# Patient Record
Sex: FEMALE | Race: WHITE | NOT HISPANIC OR LATINO | Employment: OTHER | URBAN - METROPOLITAN AREA
[De-identification: names, ages, dates, MRNs, and addresses within clinical notes are randomized per-mention and may not be internally consistent; named-entity substitution may affect disease eponyms.]

---

## 2017-06-21 ENCOUNTER — OFFICE VISIT (OUTPATIENT)
Dept: ORTHOPEDICS | Facility: CLINIC | Age: 72
End: 2017-06-21
Payer: MEDICARE

## 2017-06-21 VITALS
HEART RATE: 72 BPM | BODY MASS INDEX: 23.78 KG/M2 | SYSTOLIC BLOOD PRESSURE: 108 MMHG | WEIGHT: 148 LBS | HEIGHT: 66 IN | DIASTOLIC BLOOD PRESSURE: 60 MMHG

## 2017-06-21 DIAGNOSIS — M67.432 GANGLION CYST OF WRIST, LEFT: ICD-10-CM

## 2017-06-21 DIAGNOSIS — M18.12 ARTHRITIS OF CARPOMETACARPAL (CMC) JOINT OF LEFT THUMB: Primary | ICD-10-CM

## 2017-06-21 PROCEDURE — 73130 X-RAY EXAM OF HAND: CPT | Mod: LT,,, | Performed by: ORTHOPAEDIC SURGERY

## 2017-06-21 PROCEDURE — 99204 OFFICE O/P NEW MOD 45 MIN: CPT | Mod: 25,,, | Performed by: ORTHOPAEDIC SURGERY

## 2017-06-21 PROCEDURE — 1159F MED LIST DOCD IN RCRD: CPT | Mod: ,,, | Performed by: ORTHOPAEDIC SURGERY

## 2017-06-21 RX ORDER — VENLAFAXINE HYDROCHLORIDE 75 MG/1
75 CAPSULE, EXTENDED RELEASE ORAL DAILY
COMMUNITY
Start: 2017-03-30 | End: 2018-10-23

## 2017-06-21 RX ORDER — TIOTROPIUM BROMIDE 18 UG/1
CAPSULE ORAL; RESPIRATORY (INHALATION)
COMMUNITY
Start: 2017-03-30 | End: 2019-06-11

## 2017-06-21 RX ORDER — PANTOPRAZOLE SODIUM 40 MG/1
TABLET, DELAYED RELEASE ORAL
COMMUNITY
Start: 2017-01-01 | End: 2017-09-07 | Stop reason: SDUPTHER

## 2017-06-21 RX ORDER — FLUTICASONE PROPIONATE AND SALMETEROL 250; 50 UG/1; UG/1
POWDER RESPIRATORY (INHALATION)
COMMUNITY
Start: 2017-03-30 | End: 2019-06-11

## 2017-06-21 RX ORDER — IBANDRONATE SODIUM 3 MG/3 ML
3 SYRINGE (ML) INTRAVENOUS
COMMUNITY
End: 2017-11-02

## 2017-06-21 RX ORDER — FLUTICASONE PROPIONATE AND SALMETEROL XINAFOATE 115; 21 UG/1; UG/1
2 AEROSOL, METERED RESPIRATORY (INHALATION)
COMMUNITY
End: 2017-11-02

## 2017-06-21 RX ORDER — DILTIAZEM HYDROCHLORIDE 180 MG/1
CAPSULE, EXTENDED RELEASE ORAL
COMMUNITY

## 2017-06-21 RX ORDER — LORATADINE 10 MG/1
10 TABLET ORAL
COMMUNITY

## 2017-06-21 RX ORDER — IBANDRONATE SODIUM 150 MG/1
TABLET, FILM COATED ORAL
COMMUNITY
Start: 2017-03-30 | End: 2017-11-02 | Stop reason: SDUPTHER

## 2017-06-21 RX ORDER — DILTIAZEM HYDROCHLORIDE 180 MG/1
CAPSULE, EXTENDED RELEASE ORAL
COMMUNITY
Start: 2017-06-20 | End: 2017-11-02

## 2017-06-21 NOTE — PROGRESS NOTES
Subjective:       Chief Complaint    Chief Complaint   Patient presents with    Left Hand - Pain     She is here today for a cyst on her left hand.  It is not painful.       DANIEL Alicia is a 71 y.o.  female who presents With pain and the presence of the cyst at the base of her left thumb came up in the past month. Admits to pain when trying to unscrew jar and peeling potatoes that pain is at the base of her thumb CMC joint area. The cyst itself does not cause pain.      Past History  Past Medical History:   Diagnosis Date    Chronic bronchitis     COPD (chronic obstructive pulmonary disease)     Dysthymic disorder     Environmental allergies     GERD (gastroesophageal reflux disease)     Malignant neoplasm of overlapping sites of female breast     Mild intermittent asthma with status asthmaticus     Mixed incontinence     Osteoporosis      Past Surgical History:   Procedure Laterality Date    BLADDER REPAIR      BREAST CYST EXCISION      FOOT SURGERY      bilateral    hernia repairs      HYSTERECTOMY      masectomy      right elbow tennis elbow      TONSILLECTOMY      TUBAL LIGATION       Social History     Social History    Marital status:      Spouse name: N/A    Number of children: N/A    Years of education: N/A     Occupational History    Not on file.     Social History Main Topics    Smoking status: Former Smoker     Types: Cigarettes    Smokeless tobacco: Not on file    Alcohol use No    Drug use: Unknown    Sexual activity: Not on file     Other Topics Concern    Not on file     Social History Narrative    No narrative on file         Medications  Current Outpatient Prescriptions   Medication Sig    DILT- mg CDCR     diltiaZEM (TIAZAC) 180 MG CpSR Take by mouth.    fluticasone-salmeterol (ADVAIR HFA) 115-21 mcg/actuation HFAA Inhale 2 puffs into the lungs.    ibandronate (BONIVA) 150 mg tablet Take by mouth.    loratadine (CLARITIN) 10 mg tablet Take 10  mg by mouth.    pantoprazole (PROTONIX) 40 MG tablet Take by mouth.    tiotropium (SPIRIVA WITH HANDIHALER) 18 mcg inhalation capsule Inhale into the lungs.    venlafaxine (EFFEXOR XR) 75 MG 24 hr capsule Take by mouth.    fluticasone-salmeterol 250-50 mcg/dose (ADVAIR DISKUS) 250-50 mcg/dose diskus inhaler Inhale into the lungs.    ibandronate (BONIVA) 3 mg/3 mL injection Inject 3 mg into the vein.     No current facility-administered medications for this visit.        Allergies  Review of patient's allergies indicates:   Allergen Reactions    Meperidine     Sulfamethoxazole-trimethoprim     Sulfasalazine Hives         Review of Systems     Constitutional: Negative    HENT: Negative  Eyes: Negative  Respiratory: Negative  Cardiovascular: Negative  Musculoskeletal: HPI  Skin: Negative  Neurological: Negative  Hematological: Negative  Endocrine: Negative      Physical Exam    Vitals:    06/21/17 1420   BP: 108/60   Pulse: 72     Physical Examination:Left thumb-2 cm ganglion cyst arising from the triscaphe area. Nontender cyst. There is tenderness and crepitance at the CMC joint. Negative Tinel sign over the carpal canal. Is no atrophy of the thenar eminence.     Skin-no rashes.  General appearance -  well appearing, and in no distress  Mental status - awake  Neck - supple  Chest -  symmetric air entry  Heart - normal rate   Abdomen - soft      Assessment/Plan   Arthritis of carpometacarpal (CMC) joint of left thumb  -     X-Ray Hand 3 view Left  -     X-Ray Wrist Complete Left    Ganglion cyst of wrist, left  -     X-Ray Wrist Complete Left    Other orders  -     Cancel: X-Ray Hand 3 view Right    No evidence of fracture or dislocation on the x-rays. Complete loss of cartilage space at the CMC joint with large osteophyte. Moderate triscaphe arthritis is present    Pain level 3/4 at worst at the base of the thumb. The pain is not bad enough for an injection. Natural history of the ganglion cyst  Discussed.    This note was dictated using voice recognition software and may contain grammatical errors.

## 2017-09-07 RX ORDER — PANTOPRAZOLE SODIUM 40 MG/1
40 TABLET, DELAYED RELEASE ORAL DAILY
Qty: 90 TABLET | Refills: 1 | Status: SHIPPED | OUTPATIENT
Start: 2017-09-07 | End: 2017-11-02 | Stop reason: SDUPTHER

## 2017-09-07 RX ORDER — ALBUTEROL SULFATE 90 UG/1
2 AEROSOL, METERED RESPIRATORY (INHALATION) EVERY 6 HOURS PRN
COMMUNITY

## 2017-11-02 ENCOUNTER — OFFICE VISIT (OUTPATIENT)
Dept: FAMILY MEDICINE | Facility: CLINIC | Age: 72
End: 2017-11-02
Payer: MEDICARE

## 2017-11-02 VITALS
HEART RATE: 69 BPM | RESPIRATION RATE: 14 BRPM | DIASTOLIC BLOOD PRESSURE: 64 MMHG | HEIGHT: 66 IN | BODY MASS INDEX: 23.46 KG/M2 | SYSTOLIC BLOOD PRESSURE: 100 MMHG | WEIGHT: 146 LBS

## 2017-11-02 DIAGNOSIS — J44.89 CHRONIC OBSTRUCTIVE BRONCHITIS: Primary | ICD-10-CM

## 2017-11-02 DIAGNOSIS — E55.9 VITAMIN D DEFICIENCY: ICD-10-CM

## 2017-11-02 DIAGNOSIS — E78.89 LIPIDS ABNORMAL: ICD-10-CM

## 2017-11-02 DIAGNOSIS — K21.00 GASTROESOPHAGEAL REFLUX DISEASE WITH ESOPHAGITIS: ICD-10-CM

## 2017-11-02 DIAGNOSIS — M81.8 OTHER OSTEOPOROSIS WITHOUT CURRENT PATHOLOGICAL FRACTURE: ICD-10-CM

## 2017-11-02 DIAGNOSIS — Z11.59 NEED FOR HEPATITIS C SCREENING TEST: ICD-10-CM

## 2017-11-02 DIAGNOSIS — F34.1 DYSTHYMIA: ICD-10-CM

## 2017-11-02 PROBLEM — N39.46 MIXED STRESS AND URGE URINARY INCONTINENCE: Status: ACTIVE | Noted: 2017-11-02

## 2017-11-02 PROBLEM — J45.20 MILD INTERMITTENT ASTHMA: Status: ACTIVE | Noted: 2017-11-02

## 2017-11-02 PROBLEM — R73.01 IMPAIRED FASTING GLUCOSE: Status: ACTIVE | Noted: 2017-11-02

## 2017-11-02 PROBLEM — Z78.9 NON-SMOKER: Status: ACTIVE | Noted: 2017-11-02

## 2017-11-02 PROCEDURE — 99214 OFFICE O/P EST MOD 30 MIN: CPT | Mod: ,,, | Performed by: INTERNAL MEDICINE

## 2017-11-02 RX ORDER — PANTOPRAZOLE SODIUM 40 MG/1
40 TABLET, DELAYED RELEASE ORAL DAILY
Qty: 90 TABLET | Refills: 3 | Status: SHIPPED | OUTPATIENT
Start: 2017-11-02 | End: 2018-04-23 | Stop reason: SDUPTHER

## 2017-11-02 RX ORDER — IBUPROFEN 800 MG/1
800 TABLET ORAL EVERY 6 HOURS PRN
COMMUNITY

## 2017-11-02 RX ORDER — IBANDRONATE SODIUM 150 MG/1
150 TABLET, FILM COATED ORAL
Qty: 3 TABLET | Refills: 3 | Status: SHIPPED | OUTPATIENT
Start: 2017-11-02 | End: 2018-02-19 | Stop reason: SDUPTHER

## 2017-11-02 NOTE — PROGRESS NOTES
Subjective:       Patient ID: Yee Alicia is a 72 y.o. female.    Chief Complaint: COPD; Osteoporosis; Gastroesophageal Reflux; and Depression    Ms. Riddle is a 72-year-old  female who comes for follow-up. She has underlying COPD, osteoporosis, gastroesophageal reflux.    She uses an oxygen tank and uses inhalers. In the office she appears to be comfortable at this point. She has no recent fractures or back pain. Reflux seems to be stable as long as she takes pantoprazole.    She has underlying family and social stressors. Her  has a diagnosis of cancer and he seems to be declining both socially and physically.    Patient's exercise tolerance is also somewhat limited. She is due for her screenings for hepatitis C, lipid panel and she has never had a vitamin D level given her osteoporosis.    She had seen a cardiologist Dr. Alonso in past for palpitations and was prescribed Cardizem. Details of that cardiac workup needs to be looked into again. She did have a bone density checked 3 years ago which needs to be reviewed.      COPD   This is a chronic problem. The current episode started more than 1 year ago. The problem has been waxing and waning. Associated symptoms include fatigue. Pertinent negatives include no abdominal pain, arthralgias, chest pain, chills, congestion, coughing, fever, headaches, joint swelling, rash or sore throat. The treatment provided moderate relief.   Gastroesophageal Reflux   She reports no abdominal pain, no chest pain, no coughing, no globus sensation, no sore throat or no tooth decay. The symptoms are aggravated by certain foods and medications. Associated symptoms include fatigue. She has tried a PPI for the symptoms. The treatment provided moderate relief.       Past Medical History:   Diagnosis Date    Chronic bronchitis     COPD (chronic obstructive pulmonary disease)     Dysthymic disorder     Environmental allergies     GERD (gastroesophageal reflux disease)      Malignant neoplasm of overlapping sites of female breast     Mild intermittent asthma with status asthmaticus     Mixed incontinence     Osteoporosis      Social History     Social History    Marital status:      Spouse name: N/A    Number of children: N/A    Years of education: N/A     Occupational History    Not on file.     Social History Main Topics    Smoking status: Former Smoker     Types: Cigarettes    Smokeless tobacco: Never Used    Alcohol use No    Drug use: No    Sexual activity: Yes     Partners: Male     Other Topics Concern    Not on file     Social History Narrative    No narrative on file     Past Surgical History:   Procedure Laterality Date    BLADDER REPAIR      BREAST CYST EXCISION      FOOT SURGERY      bilateral    hernia repairs      HYSTERECTOMY      masectomy      right elbow tennis elbow      TONSILLECTOMY      TUBAL LIGATION       Family History   Problem Relation Age of Onset    Diabetes Mother     Hypertension Father        Review of Systems   Constitutional: Positive for fatigue. Negative for activity change, chills, fever and unexpected weight change.   HENT: Negative for congestion, postnasal drip, sinus pressure and sore throat.    Eyes: Negative for pain, discharge and visual disturbance.   Respiratory: Positive for shortness of breath. Negative for cough and chest tightness.         COPD   Cardiovascular: Negative for chest pain, palpitations and leg swelling.   Gastrointestinal: Negative for abdominal distention, abdominal pain, anal bleeding, constipation and diarrhea.        GERD   Genitourinary: Negative for difficulty urinating, dysuria, flank pain and frequency.   Musculoskeletal: Negative for arthralgias and joint swelling.   Skin: Negative for color change, pallor and rash.   Allergic/Immunologic: Negative for environmental allergies, food allergies and immunocompromised state.   Neurological: Negative for dizziness, seizures,  "light-headedness and headaches.   Hematological: Negative for adenopathy. Does not bruise/bleed easily.   Psychiatric/Behavioral: Negative for agitation, confusion and dysphoric mood. The patient is not nervous/anxious.        Objective:       Vitals:    11/02/17 1046   BP: 100/64   Pulse: 69   Resp: 14   Weight: 66.2 kg (146 lb)   Height: 5' 6" (1.676 m)   PF: 370 L/min     Physical Exam   Constitutional: She is oriented to person, place, and time. She appears well-developed and well-nourished. She is cooperative. No distress.   HENT:   Head: Normocephalic and atraumatic.   Eyes: Conjunctivae, EOM and lids are normal. Pupils are equal, round, and reactive to light. Lids are everted and swept, no foreign bodies found. Right pupil is round and reactive. Left pupil is round and reactive.   Neck: Trachea normal and normal range of motion. Neck supple.   Cardiovascular: Normal rate, regular rhythm, S1 normal, S2 normal, normal heart sounds and intact distal pulses.    Pulmonary/Chest: She has decreased breath sounds in the right middle field, the right lower field, the left middle field and the left lower field.   Abdominal: Soft. Bowel sounds are normal. There is no rigidity and no guarding.   Musculoskeletal: Normal range of motion.   Lymphadenopathy:     She has no cervical adenopathy.   Neurological: She is alert and oriented to person, place, and time.   Skin: Skin is warm and dry. Capillary refill takes less than 2 seconds.   Psychiatric: She has a normal mood and affect. Her behavior is normal. Judgment and thought content normal. Her affect is not labile and not inappropriate.   Though not overtly depressed, patient appears to be anhedonic.   Nursing note and vitals reviewed.      Assessment:       1. Chronic obstructive bronchitis    2. Gastroesophageal reflux disease with esophagitis    3. Dysthymia    4. Other osteoporosis without current pathological fracture    5. Need for hepatitis C screening test    6. " Lipids abnormal    7. Vitamin D deficiency         Plan:           Chronic obstructive bronchitis    Gastroesophageal reflux disease with esophagitis  -     pantoprazole (PROTONIX) 40 MG tablet; Take 1 tablet (40 mg total) by mouth once daily.  Dispense: 90 tablet; Refill: 3    Dysthymia    Other osteoporosis without current pathological fracture  -     ibandronate (BONIVA) 150 mg tablet; Take 1 tablet (150 mg total) by mouth every 30 days.  Dispense: 3 tablet; Refill: 3    Need for hepatitis C screening test  -     Hepatitis C antibody; Future; Expected date: 11/02/2017    Lipids abnormal  -     Lipid panel; Future; Expected date: 11/02/2017    Vitamin D deficiency  -     Vitamin D; Future; Expected date: 11/02/2017    Patient COPD seems to be stable. She is following with Dr. Gage for the same. She uses Spiriva inhaler, Advair inhaler and as needed albuterol.    Her reflux seems to be stable and she takes pantoprazole for the same.    Family and social stressors seem to be predominating at this point.    She is already updated on influenza vaccination.    She is being referred on Boniva and pantoprazole.    As per age appropriate status I would screen her for hepatitis C, lipid panel and vitamin D. There is increased incidence of vitamin D deficiency in patients who have osteoporosis and COPD.

## 2017-11-02 NOTE — PATIENT INSTRUCTIONS
Medicines for Acid Reflux  Your healthcare provider has told you that you have acid reflux. This condition causes stomach acid to wash up into your throat. For most people, acid reflux is troubling but not dangerous. But left untreated, acid reflux sometimes damages the esophagus. Medicines can help control acid reflux and limit your risk of future problems.  Medicines for acid reflux  Your healthcare provider may prescribe medicine to help treat your acid reflux. Medicine will be based on your symptoms and any test results. Your provider will explain how to take your medicine. You will also be told about possible side effects.  Reducing stomach acid  Your provider may suggest antacids that you can buy over the counter. Antacids can give fast relief. Or you may be told to take a type of medicine called H2 blockers. These are available over the counter and by prescription (for higher doses).  Blocking stomach acid  In more severe cases, your healthcare provider may suggest stronger medicines such as proton pump inhibitors (PPIs). These keep the stomach from making acid. They are often prescribed for long-term use.  Other medicines  In some cases medicines to reduce or block stomach acid may not work. Then you may be switched to another type of medicine that helps your stomach empty better.     Date Last Reviewed: 10/1/2016  © 5037-0795 [employer]. [address] [place] [address], [place] [address]. All rights reserved. This information is not intended as a substitute for professional medical care. Always follow your healthcare professional's instructions.

## 2017-11-06 ENCOUNTER — TELEPHONE (OUTPATIENT)
Dept: FAMILY MEDICINE | Facility: CLINIC | Age: 72
End: 2017-11-06

## 2017-11-06 LAB — VITAMIN D, 1,25 (OH)2: 61.1 NG/ML (ref 30–100)

## 2017-11-08 ENCOUNTER — TELEPHONE (OUTPATIENT)
Dept: FAMILY MEDICINE | Facility: CLINIC | Age: 72
End: 2017-11-08

## 2017-11-08 LAB — HCV AB SERPL QL IA: 0.1 S/CO RATIO (ref 0–0.9)

## 2017-11-08 NOTE — TELEPHONE ENCOUNTER
----- Message from Efren Modi MD sent at 11/8/2017  3:43 PM CST -----  Hepatitis C screening is negative

## 2018-02-19 DIAGNOSIS — M81.8 OTHER OSTEOPOROSIS WITHOUT CURRENT PATHOLOGICAL FRACTURE: ICD-10-CM

## 2018-02-19 RX ORDER — IBANDRONATE SODIUM 150 MG/1
150 TABLET, FILM COATED ORAL
Qty: 3 TABLET | Refills: 3 | Status: SHIPPED | OUTPATIENT
Start: 2018-02-19 | End: 2018-10-23 | Stop reason: SDUPTHER

## 2018-04-23 ENCOUNTER — OFFICE VISIT (OUTPATIENT)
Dept: FAMILY MEDICINE | Facility: CLINIC | Age: 73
End: 2018-04-23
Payer: MEDICARE

## 2018-04-23 VITALS
DIASTOLIC BLOOD PRESSURE: 65 MMHG | SYSTOLIC BLOOD PRESSURE: 97 MMHG | HEIGHT: 66 IN | BODY MASS INDEX: 24.06 KG/M2 | RESPIRATION RATE: 16 BRPM | OXYGEN SATURATION: 89 % | WEIGHT: 149.69 LBS | HEART RATE: 71 BPM

## 2018-04-23 DIAGNOSIS — J44.89 CHRONIC OBSTRUCTIVE BRONCHITIS: ICD-10-CM

## 2018-04-23 DIAGNOSIS — Z78.0 ASYMPTOMATIC MENOPAUSE: ICD-10-CM

## 2018-04-23 DIAGNOSIS — F34.1 DYSTHYMIA: ICD-10-CM

## 2018-04-23 DIAGNOSIS — K21.00 GASTROESOPHAGEAL REFLUX DISEASE WITH ESOPHAGITIS: Primary | ICD-10-CM

## 2018-04-23 PROCEDURE — 99213 OFFICE O/P EST LOW 20 MIN: CPT | Mod: ,,, | Performed by: INTERNAL MEDICINE

## 2018-04-23 RX ORDER — PANTOPRAZOLE SODIUM 40 MG/1
40 TABLET, DELAYED RELEASE ORAL DAILY
Qty: 90 TABLET | Refills: 3 | Status: SHIPPED | OUTPATIENT
Start: 2018-04-23 | End: 2018-11-02 | Stop reason: SDUPTHER

## 2018-04-23 NOTE — PROGRESS NOTES
Subjective:       Patient ID: Yee Alicia is a 72 y.o. female.    Chief Complaint: Gastroesophageal Reflux and COPD    Ms. Alicia is a 72-year-old  female who comes for follow-up. She has underlying reflux which is stable with pantoprazole. She knows what type of diet is going to aggravate her reflux and especially it is any gravy with tomato.    She also has COPD and follows Dr. Gage for the same. She uses a combination of 81 inhaler and also as needed rescue beer 2 agonists. She also uses Advair inhaler. Does far she is stable on this.    She had the last bone density done approximately a year or 2 back. I have records indicating it was done in 2014. She'll verify this. She is taking Boniva and calcium supplements without any problems.    She has mild depression and anxiety for which she is taking venlafaxine with success. Her  was diagnosed with cancer and patient had some stress  because of those issues. Patient's stress also including her mother and son with aging and medical issues which continue to keep her in a stage of borderline stress and sanity.    I've advised the patient to consider cutting down the pantoprazole to alternate days and tried her naturally heal her stomach. Certainly on the days that she has a pizza with tomato gravy-that they would be a pantoprazole day. On the other hand, on a day when she is likely to eat a bland diet-probably she can skip pantoprazole. Long-term side effects of pantoprazole include kidney problems, vitamin B-12 deficiency and probably some memory problems. These have been antidotal reports and have have never been proven but I would like to get that chance of protection to the patient.      Gastroesophageal Reflux   She complains of heartburn. She reports no abdominal pain, no chest pain, no coughing, no hoarse voice or no sore throat. The current episode started more than 1 year ago. Associated symptoms include fatigue. She has tried a PPI for  the symptoms. The treatment provided moderate relief.   COPD   This is a chronic problem. Associated symptoms include fatigue. Pertinent negatives include no abdominal pain, arthralgias, chest pain, chills, congestion, coughing, fever, headaches, joint swelling, rash or sore throat.       Past Medical History:   Diagnosis Date    Allergy     Mepridine, Bactrim, BEE sting    Chronic bronchitis     COPD (chronic obstructive pulmonary disease)     Dysthymic disorder     Environmental allergies     GERD (gastroesophageal reflux disease)     Malignant neoplasm of overlapping sites of female breast     Mild intermittent asthma with status asthmaticus     Mixed incontinence     Osteoporosis      Social History     Social History    Marital status:      Spouse name: N/A    Number of children: N/A    Years of education: N/A     Occupational History    Not on file.     Social History Main Topics    Smoking status: Former Smoker     Types: Cigarettes    Smokeless tobacco: Never Used    Alcohol use No    Drug use: No    Sexual activity: Yes     Partners: Male     Other Topics Concern    Not on file     Social History Narrative    No narrative on file     Past Surgical History:   Procedure Laterality Date    BLADDER REPAIR      BREAST CYST EXCISION      FOOT SURGERY      bilateral    hernia repairs      HYSTERECTOMY      masectomy      right elbow tennis elbow      TONSILLECTOMY      TUBAL LIGATION       Family History   Problem Relation Age of Onset    Diabetes Mother     Hypertension Father        Review of Systems   Constitutional: Positive for fatigue. Negative for activity change, chills, fever and unexpected weight change.   HENT: Negative for congestion, hoarse voice, postnasal drip, sinus pressure and sore throat.    Eyes: Negative for pain, discharge and visual disturbance.   Respiratory: Positive for shortness of breath. Negative for cough and chest tightness.         COPD  "  Cardiovascular: Negative for chest pain, palpitations and leg swelling.   Gastrointestinal: Positive for heartburn. Negative for abdominal distention, abdominal pain and anal bleeding.        GERD   Genitourinary: Negative for difficulty urinating, dysuria, flank pain and frequency.   Musculoskeletal: Negative for arthralgias and joint swelling.   Skin: Negative for color change, pallor and rash.   Allergic/Immunologic: Negative for environmental allergies, food allergies and immunocompromised state.   Neurological: Negative for dizziness, seizures, light-headedness and headaches.   Hematological: Negative for adenopathy. Does not bruise/bleed easily.   Psychiatric/Behavioral: Negative for agitation, confusion and dysphoric mood. The patient is not nervous/anxious.         Mild depression       Objective:       Vitals:    04/23/18 1435   BP: 97/65   Pulse: 71   Resp: 16   SpO2: (!) 89%   Weight: 67.9 kg (149 lb 11.2 oz)   Height: 5' 6" (1.676 m)     Physical Exam   Constitutional: She appears well-developed and well-nourished. She is cooperative. No distress.   HENT:   Head: Normocephalic and atraumatic.   Eyes: Conjunctivae, EOM and lids are normal. Pupils are equal, round, and reactive to light. Lids are everted and swept, no foreign bodies found. Right pupil is round and reactive. Left pupil is round and reactive.   Neck: Trachea normal and normal range of motion. Neck supple.   Cardiovascular: Normal rate, regular rhythm, S1 normal, S2 normal and normal heart sounds.    Pulmonary/Chest: She has decreased breath sounds in the right middle field, the right lower field, the left middle field and the left lower field.   Abdominal: Soft. Bowel sounds are normal. There is no rigidity and no guarding.   Musculoskeletal: Normal range of motion.   Lymphadenopathy:     She has no cervical adenopathy.   Neurological: She is alert.   Skin: Skin is warm and dry.   Psychiatric: Her affect is not labile and not " inappropriate. She is not actively hallucinating. Cognition and memory are not impaired. She does not express impulsivity.   Though not overtly depressed, patient appears to be anhedonic.   Nursing note and vitals reviewed.      Assessment:       1. Gastroesophageal reflux disease with esophagitis    2. Dysthymia    3. Asymptomatic menopause    4. Chronic obstructive bronchitis         Plan:           Gastroesophageal reflux disease with esophagitis  -     pantoprazole (PROTONIX) 40 MG tablet; Take 1 tablet (40 mg total) by mouth once daily.  Dispense: 90 tablet; Refill: 3    Dysthymia    Asymptomatic menopause  -     DXA Bone Density Spine And Hip    Chronic obstructive bronchitis    As discussed in history and physical patient will try to cut down on pantoprazole and see how she does. She is doing okay on that medication . venlafaxine given the fact that she has persistent stressors. (92-year-old mother, son and  who has a cancer diagnosis.)  A shunt doesn't fall me that on less stressful phase she takes half of venlafaxine. On her usual stressful day she takes a full pill of venlafaxine.       New prescription for pantoprazole has been sent    I'll give with a new order for bone density if it is allowed.    She continues on Boniva with calcium and vitamin D3 supplementation. I will also recommended some strength exercises. All things being equal, then I'll see her back in 6 months time. Patient's oxygen saturation is 89-90% on room air. She does use oxygen at home. She does get a little short winded on walking. She definitely uses oxygen at nighttime. Reflux/depression    Follow-up in 6 months or earlier as needed.

## 2018-04-23 NOTE — PATIENT INSTRUCTIONS

## 2018-05-22 ENCOUNTER — TELEPHONE (OUTPATIENT)
Dept: FAMILY MEDICINE | Facility: CLINIC | Age: 73
End: 2018-05-22

## 2018-10-23 ENCOUNTER — OFFICE VISIT (OUTPATIENT)
Dept: FAMILY MEDICINE | Facility: CLINIC | Age: 73
End: 2018-10-23
Payer: MEDICARE

## 2018-10-23 VITALS
BODY MASS INDEX: 22.98 KG/M2 | HEIGHT: 66 IN | SYSTOLIC BLOOD PRESSURE: 98 MMHG | OXYGEN SATURATION: 96 % | DIASTOLIC BLOOD PRESSURE: 68 MMHG | RESPIRATION RATE: 16 BRPM | WEIGHT: 143 LBS | HEART RATE: 66 BPM

## 2018-10-23 DIAGNOSIS — J44.89 CHRONIC OBSTRUCTIVE BRONCHITIS: ICD-10-CM

## 2018-10-23 DIAGNOSIS — Z01.818 PREOP EXAMINATION: ICD-10-CM

## 2018-10-23 DIAGNOSIS — M81.8 OTHER OSTEOPOROSIS WITHOUT CURRENT PATHOLOGICAL FRACTURE: ICD-10-CM

## 2018-10-23 DIAGNOSIS — F34.1 DYSTHYMIA: Primary | ICD-10-CM

## 2018-10-23 PROBLEM — I10 ESSENTIAL HYPERTENSION: Status: ACTIVE | Noted: 2018-10-23

## 2018-10-23 PROCEDURE — 99214 OFFICE O/P EST MOD 30 MIN: CPT | Mod: ,,, | Performed by: INTERNAL MEDICINE

## 2018-10-23 RX ORDER — VENLAFAXINE 75 MG/1
75 TABLET ORAL DAILY
Qty: 90 TABLET | Refills: 3 | Status: SHIPPED | OUTPATIENT
Start: 2018-10-23 | End: 2018-11-02 | Stop reason: SDUPTHER

## 2018-10-23 RX ORDER — IBANDRONATE SODIUM 150 MG/1
150 TABLET, FILM COATED ORAL
Qty: 3 TABLET | Refills: 3 | Status: SHIPPED | OUTPATIENT
Start: 2018-10-23 | End: 2019-02-25 | Stop reason: SDUPTHER

## 2018-10-23 RX ORDER — VENLAFAXINE 75 MG/1
75 TABLET ORAL DAILY
COMMUNITY
End: 2018-10-23 | Stop reason: SDUPTHER

## 2018-10-23 NOTE — PATIENT INSTRUCTIONS
Diagnosing COPD  Your healthcare provider will use your past health history, a physical exam, and certain tests to diagnose COPD.  Health history  Your healthcare provider learns about your health history by asking questions. Topics include:  · History of present illness. Tell your healthcare provider about your symptoms. Also tell him or her how long you have had them.  · Past medical and surgical history. Share other health problems and surgery you have had.  · Family history. Report serious health problems in close family members. This is especially true of any lung problems.  · Social and environmental history. The most important factor in COPD is whether you smoke or have smoked in the past. You should also tell your provider if you have been around secondhand smoke, harmful chemicals, or air pollution.  · Functional assessment. Report whether breathing gets in the way of your daily activities.  Physical exam    Your provider will examine you. He or she will check your heart and lungs with a stethoscope. The focus will be on your airways, including your nose and throat.   Diagnostic tests  · Pulmonary function tests measure the flow of air into and out of your lungs. They also check how much air your lungs can hold. The most common pulmonary function test is spirometry. This measures how fast and how much air you can blow out (exhale). This test is important to help diagnose COPD.  · Pulse oximetry shows how much oxygen is in your blood (oxygen saturation). This may be done at rest. It may also be done during and after exercise.  · Arterial blood gas tests measure levels of oxygen and carbon dioxide in your blood.  · Chest X-rays show the size and shape of your lungs. They can also show certain problems in the lungs.  · CT scans show detailed images of the lungs.  Date Last Reviewed: 10/1/2016  © 7400-4110 [employer]. [address] [place] [address], [place] [address]. All rights reserved. This  information is not intended as a substitute for professional medical care. Always follow your healthcare professional's instructions.

## 2018-10-23 NOTE — PROGRESS NOTES
Subjective:       Patient ID: Yee Alicia is a 73 y.o. female.    Chief Complaint: Pre-op Exam (cateract surg )    Ms Alicia is here for getting medical clearance for proposed cataract surgery to be done under monitored anesthesia care.    She has underlying COPD and is on chronic oxygen dependency. She uses Advair and Spiriva inhaler.    She uses usually oxygen and she has to exert or walk longer distances.    Recent hospitalization or exacerbation of COPD. She is currently not on steroids.    She does take diltiazem for history of possibly atrial arrhythmias.  Sheet of his osteoporosis for which she takes Boniva and supplements.        Hypertension   This is a chronic problem. The current episode started more than 1 year ago. Associated symptoms include shortness of breath. Pertinent negatives include no chest pain, headaches or palpitations. Risk factors for coronary artery disease include sedentary lifestyle. Past treatments include calcium channel blockers. There is no history of coarctation of the aorta, pheochromocytoma or renovascular disease.       Past Medical History:   Diagnosis Date    Allergy     Mepridine, Bactrim, BEE sting    Chronic bronchitis     COPD (chronic obstructive pulmonary disease)     Dysthymic disorder     Environmental allergies     GERD (gastroesophageal reflux disease)     Malignant neoplasm of overlapping sites of female breast     Mild intermittent asthma with status asthmaticus     Mixed incontinence     Osteoporosis      Social History     Socioeconomic History    Marital status:      Spouse name: Not on file    Number of children: Not on file    Years of education: Not on file    Highest education level: Not on file   Social Needs    Financial resource strain: Not on file    Food insecurity - worry: Not on file    Food insecurity - inability: Not on file    Transportation needs - medical: Not on file    Transportation needs - non-medical: Not on file    Occupational History    Not on file   Tobacco Use    Smoking status: Former Smoker     Types: Cigarettes    Smokeless tobacco: Never Used   Substance and Sexual Activity    Alcohol use: No    Drug use: No    Sexual activity: Yes     Partners: Male   Other Topics Concern    Not on file   Social History Narrative    Not on file     Past Surgical History:   Procedure Laterality Date    BLADDER REPAIR      BREAST CYST EXCISION      FOOT SURGERY      bilateral    hernia repairs      HYSTERECTOMY      masectomy      right elbow tennis elbow      TONSILLECTOMY      TUBAL LIGATION       Family History   Problem Relation Age of Onset    Diabetes Mother     Hypertension Father        Review of Systems   Constitutional: Positive for fatigue. Negative for activity change, chills, fever and unexpected weight change.   HENT: Negative for congestion, postnasal drip, sinus pressure and sore throat.    Eyes: Positive for visual disturbance (cataracts). Negative for pain and discharge.   Respiratory: Positive for shortness of breath. Negative for cough and chest tightness.         COPD   Cardiovascular: Negative for chest pain, palpitations and leg swelling.   Gastrointestinal: Negative for abdominal distention, abdominal pain and anal bleeding.        GERD- stable   Endocrine: Negative for polydipsia and polyphagia.   Genitourinary: Negative for difficulty urinating, dysuria, flank pain and frequency.   Musculoskeletal: Negative for arthralgias and joint swelling.        Underlying osteopenia   Skin: Negative for color change, pallor and rash.   Allergic/Immunologic: Negative for environmental allergies, food allergies and immunocompromised state.   Neurological: Negative for dizziness, seizures, light-headedness and headaches.   Hematological: Negative for adenopathy. Does not bruise/bleed easily.   Psychiatric/Behavioral: Negative for agitation, confusion and dysphoric mood. The patient is not nervous/anxious.  "        Mild depression         Objective:      Blood pressure 98/68, pulse 66, resp. rate 16, height 5' 6" (1.676 m), weight 64.9 kg (143 lb), SpO2 96 %, peak flow 400 L/min. Body mass index is 23.08 kg/m².  Physical Exam   Constitutional: She appears well-developed and well-nourished. She is cooperative. No distress.   HENT:   Head: Normocephalic and atraumatic.   Eyes: Conjunctivae, EOM and lids are normal. Lids are everted and swept, no foreign bodies found. Right pupil is round and reactive. Left pupil is round and reactive.   Neck: Trachea normal and normal range of motion. Neck supple.   Cardiovascular: Normal rate, regular rhythm, S1 normal, S2 normal and normal heart sounds.   Pulmonary/Chest: No respiratory distress. She has no wheezes. She has no rales.   Abdominal: Soft. Bowel sounds are normal. There is no rigidity and no guarding.   Lymphadenopathy:     She has no cervical adenopathy.   Neurological: She is alert.   Skin: Skin is warm and dry.   Psychiatric: Her affect is not labile and not inappropriate. She is not actively hallucinating. Cognition and memory are not impaired. She does not express impulsivity.   Though not overtly depressed, patient appears to be anhedonic. Issues with her  and likely the location to be with her parents.   Nursing note and vitals reviewed.        Assessment:       1. Dysthymia    2. Preop examination    3. Other osteoporosis without current pathological fracture    4. Chronic obstructive bronchitis           No visits with results within 3 Month(s) from this visit.   Latest known visit with results is:   Office Visit on 11/02/2017   Component Date Value Ref Range Status    Hepatitis C Ab 11/06/2017 0.1  0.0 - 0.9 s/co ratio Final    Vitamin D, 1,25 (OH)2 11/06/2017 61.10  30.00 - 100.00 ng/mL Final         Plan:           Dysthymia  -     venlafaxine (EFFEXOR) 75 MG tablet; Take 1 tablet (75 mg total) by mouth once daily.  Dispense: 90 tablet; Refill: " 3    Preop examination    Other osteoporosis without current pathological fracture  -     ibandronate (BONIVA) 150 mg tablet; Take 1 tablet (150 mg total) by mouth every 30 days.  Dispense: 3 tablet; Refill: 3    Chronic obstructive bronchitis      Patient is medically cleared for proposed cataract surgery. She has home oxygen and she should continue with oxygen while she is going to surgery. She should use her inhaler on the morning of surgery including Advair and Spiriva.    She is taking the diltiazem for history of palpitations and arrhythmias especially which got worse after taking chocolates.    She will be refilled meds for osteoporosis    Fup 4 months    Current Outpatient Medications:     albuterol (PROAIR HFA) 90 mcg/actuation inhaler, Inhale 2 puffs into the lungs every 6 (six) hours as needed for Wheezing. Rescue, Disp: , Rfl:     diltiaZEM (TIAZAC) 180 MG CpSR, Take by mouth., Disp: , Rfl:     fluticasone-salmeterol 250-50 mcg/dose (ADVAIR DISKUS) 250-50 mcg/dose diskus inhaler, Inhale into the lungs., Disp: , Rfl:     ibandronate (BONIVA) 150 mg tablet, Take 1 tablet (150 mg total) by mouth every 30 days., Disp: 3 tablet, Rfl: 3    ibuprofen (ADVIL,MOTRIN) 800 MG tablet, Take 800 mg by mouth every 6 (six) hours as needed for Pain., Disp: , Rfl:     loratadine (CLARITIN) 10 mg tablet, Take 10 mg by mouth., Disp: , Rfl:     pantoprazole (PROTONIX) 40 MG tablet, Take 1 tablet (40 mg total) by mouth once daily., Disp: 90 tablet, Rfl: 3    tiotropium (SPIRIVA WITH HANDIHALER) 18 mcg inhalation capsule, Inhale into the lungs., Disp: , Rfl:     venlafaxine (EFFEXOR) 75 MG tablet, Take 1 tablet (75 mg total) by mouth once daily., Disp: 90 tablet, Rfl: 3

## 2018-11-02 DIAGNOSIS — F34.1 DYSTHYMIA: ICD-10-CM

## 2018-11-02 DIAGNOSIS — K21.00 GASTROESOPHAGEAL REFLUX DISEASE WITH ESOPHAGITIS: ICD-10-CM

## 2018-11-02 RX ORDER — PANTOPRAZOLE SODIUM 40 MG/1
40 TABLET, DELAYED RELEASE ORAL DAILY
Qty: 90 TABLET | Refills: 3 | Status: SHIPPED | OUTPATIENT
Start: 2018-11-02

## 2018-11-02 RX ORDER — VENLAFAXINE 75 MG/1
75 TABLET ORAL DAILY
Qty: 90 TABLET | Refills: 3 | Status: SHIPPED | OUTPATIENT
Start: 2018-11-02 | End: 2019-10-24 | Stop reason: SDUPTHER

## 2018-12-07 RX ORDER — LEVOFLOXACIN 750 MG/1
30 TABLET ORAL
COMMUNITY
End: 2019-02-25

## 2018-12-07 RX ORDER — HYDROCODONE BITARTRATE AND ACETAMINOPHEN 5; 325 MG/1; MG/1
10 TABLET ORAL
COMMUNITY
End: 2019-02-25

## 2018-12-07 RX ORDER — ANASTROZOLE 1 MG/1
30 TABLET ORAL
COMMUNITY

## 2018-12-07 RX ORDER — METRONIDAZOLE 500 MG/1
30 TABLET ORAL
COMMUNITY
End: 2019-02-25

## 2018-12-10 ENCOUNTER — OFFICE VISIT (OUTPATIENT)
Dept: PULMONOLOGY | Facility: CLINIC | Age: 73
End: 2018-12-10
Payer: MEDICARE

## 2018-12-10 VITALS
SYSTOLIC BLOOD PRESSURE: 102 MMHG | WEIGHT: 142 LBS | HEART RATE: 87 BPM | OXYGEN SATURATION: 93 % | BODY MASS INDEX: 22.82 KG/M2 | HEIGHT: 66 IN | DIASTOLIC BLOOD PRESSURE: 80 MMHG

## 2018-12-10 DIAGNOSIS — J44.9 CHRONIC OBSTRUCTIVE PULMONARY DISEASE, UNSPECIFIED COPD TYPE: ICD-10-CM

## 2018-12-10 DIAGNOSIS — G47.34 NOCTURNAL HYPOXEMIA: ICD-10-CM

## 2018-12-10 PROCEDURE — 99214 OFFICE O/P EST MOD 30 MIN: CPT | Mod: ,,, | Performed by: INTERNAL MEDICINE

## 2018-12-10 NOTE — PATIENT INSTRUCTIONS
Chronic Lung Disease: Preventing Lung Infections  Chronic lung diseases include chronic obstructive pulmonary disease (COPD), which includes chronic bronchitis and emphysema. Other chronic lung diseases include pulmonary fibrosis, sarcoidosis, and other conditions. When you have chronic lung diseases, it's very important to protect yourself from respiratory infections, like colds, the flu, and lung infections. Infections may cause your lung condition to worsen. Although you can't completely avoid them, there are things you can do to lessen the chance of infections.    Take precautions  Taking the following precautions can help you avoid illness:  · Remember to keep your hands away from your nose and mouth. Germs on your hands get into your respiratory system this way.  · Wash your hands often. When you wash them:  ¨ Use soap and warm water.  ¨ Rub your hands together well for at least 20 seconds.  ¨ Make sure to rinse them well.  ¨ Dry your hands on clean towels or air-dry them.  · Use hand  containing alcohol, if you are unable to wash your hands. Use the  after touching doorknobs, handles, and supermarket carts, for example, since lots of people touch them. Then wash your hands as soon as you can.  · To help prevent the flu, get a flu vaccination every year. This may be given at your healthcare provider's office, a drugstore, or pharmacy, or at work. Get your flu shot as soon as the vaccines are available in your area. This is usually around September each year.  · To help prevent pneumococcal pneumonia, get pneumonia vaccinations. Talk with your healthcare provider about which pneumococcal vaccinations you need.  · Try to stay away from people with respiratory infections, such as colds or the flu. Stay away from crowded places, like shopping centers or movie theatres during cold and flu season.  · If you smoke, think about quitting. In addition to causing or worsening many lung conditions, the  lung damage from smoking increases your risk of infections. Stay away from others who smoke, too. This is also harmful and increases your chance of infections.  Date Last Reviewed: 4/14/2016  © 6625-6959 [employer]. [address] [place] [address], [place] [address]. All rights reserved. This information is not intended as a substitute for professional medical care. Always follow your healthcare professional's instructions.       call and let us know what is cheaper Trelegy or Advair and Spiriva  Keep sleeping on your oxygen

## 2018-12-10 NOTE — PROGRESS NOTES
SUBJECTIVE:    Patient ID: Yee Alicia is a 73 y.o. female.    Chief Complaint: Follow-up ( 6 mo check )    HPI   Patient here today feeling well.  She is either using Advair and spiriva, or Trelegy depending on cost.  She is wearing her oxygen with sleep and as needed throughout the day.  She is going out of town to take care of her parents that are having surgery.    Past Medical History:   Diagnosis Date    Allergy     Mepridine, Bactrim, BEE sting    Breast cancer     Cervical spine fracture     Chronic bronchitis     COPD (chronic obstructive pulmonary disease)     Dysthymic disorder     Environmental allergies     GERD (gastroesophageal reflux disease)     Lumbar vertebral fracture     Lung disease     copd     Malignant neoplasm of overlapping sites of female breast     Mixed incontinence     Osteoporosis      Past Surgical History:   Procedure Laterality Date    BLADDER REPAIR      BREAST CYST EXCISION      FOOT SURGERY      bilateral    hernia repairs      HYSTERECTOMY      masectomy      right elbow tennis elbow      TONSILLECTOMY      TUBAL LIGATION       Family History   Problem Relation Age of Onset    Diabetes Mother     Hypertension Father         Social History:   Marital Status:   Occupation: homemaker   Alcohol History:  reports that she does not drink alcohol.  Tobacco History:  reports that she quit smoking about 22 years ago. Her smoking use included cigarettes. She has a 10.00 pack-year smoking history. she has never used smokeless tobacco.  Drug History:  reports that she does not use drugs.    Review of patient's allergies indicates:   Allergen Reactions    Bee sting [allergen ext-venom-honey bee]     Meperidine     Sulfamethoxazole-trimethoprim     Sulfasalazine Hives       Current Outpatient Medications   Medication Sig Dispense Refill    albuterol (PROAIR HFA) 90 mcg/actuation inhaler Inhale 2 puffs into the lungs every 6 (six) hours as needed for  "Wheezing. Rescue      anastrozole (ARIMIDEX) 1 mg Tab Take 30 tablets by mouth.      diltiaZEM (TIAZAC) 180 MG CpSR Take by mouth.      fluticasone-salmeterol 250-50 mcg/dose (ADVAIR DISKUS) 250-50 mcg/dose diskus inhaler Inhale into the lungs.      ibandronate (BONIVA) 150 mg tablet Take 1 tablet (150 mg total) by mouth every 30 days. 3 tablet 3    ibuprofen (ADVIL,MOTRIN) 800 MG tablet Take 800 mg by mouth every 6 (six) hours as needed for Pain.      loratadine (CLARITIN) 10 mg tablet Take 10 mg by mouth.      pantoprazole (PROTONIX) 40 MG tablet Take 1 tablet (40 mg total) by mouth once daily. 90 tablet 3    tiotropium (SPIRIVA WITH HANDIHALER) 18 mcg inhalation capsule Inhale into the lungs.      venlafaxine (EFFEXOR) 75 MG tablet Take 1 tablet (75 mg total) by mouth once daily. 90 tablet 3    fluticasone-umeclidin-vilanter (TRELEGY ELLIPTA) 100-62.5-25 mcg DsDv Inhale 1 puff into the lungs once daily. 1 each 6    HYDROcodone-acetaminophen (NORCO) 5-325 mg per tablet Take 10 tablets by mouth.      levoFLOXacin (LEVAQUIN) 750 MG tablet Take 30 tablets by mouth.      metroNIDAZOLE (FLAGYL) 500 MG tablet Take 30 tablets by mouth.       No current facility-administered medications for this visit.          Last PFT: 12/13/2017  Last Chest xray :03/21/2016    Review of Systems  General: Feeling Well.  Eyes: Vision is good.  ENT:  No sinusitis or pharyngitis.   Heart:: No chest pain or palpitations.  Lungs: breathing is stable   GI: No Nausea, vomiting, constipation, diarrhea, or reflux.  : No dysuria, hesitancy, or nocturia.  Musculoskeletal: back and neck pAin   Skin: No lesions or rashes.  Neuro: No headaches or neuropathy.  Lymph: No edema or adenopathy.  Psych: No anxiety or depression.  Endo: No weight change.    OBJECTIVE:      /80 (BP Location: Right arm, Patient Position: Sitting)   Pulse 87   Ht 5' 6" (1.676 m)   Wt 64.4 kg (142 lb)   SpO2 (!) 93%   BMI 22.92 kg/m²     Physical " "Exam  GENERAL: Older patient in no distress.  HEENT: Pupils equal and reactive. Extraocular movements intact. Nose intact.      Pharynx moist.  NECK: Supple.   HEART: Regular rate and rhythm. No murmur or gallop auscultated.  LUNGS: Clear to auscultation and percussion. Lung excursion symmetrical. No change in fremitus. No adventitial noises.  ABDOMEN: Bowel sounds present. Non-tender, no masses palpated.  EXTREMITIES: Normal muscle tone and joint movement, no cyanosis or clubbing.   LYMPHATICS: No adenopathy palpated, no edema.  SKIN: Dry, intact, no lesions.   NEURO: Cranial nerves II-XII intact. Motor strength 5/5 bilaterally, upper and lower extremities.  PSYCH: Appropriate affect.    Amisha Herring NP served in the capacity as a "scribe" for this patient encounter.  A "face to face" encounter occurred with Dr. Gage on this date  The treatment plan and medical decision making is outlined below:         Assessment:       1. Chronic obstructive pulmonary disease, unspecified COPD type    2. Nocturnal hypoxemia          Plan:       Chronic obstructive pulmonary disease, unspecified COPD type    Nocturnal hypoxemia    Other orders  -     fluticasone-umeclidin-vilanter (TRELEGY ELLIPTA) 100-62.5-25 mcg DsDv; Inhale 1 puff into the lungs once daily.  Dispense: 1 each; Refill: 6      Call and let us know what is cheaper Trelegy or Advair and Spiriva  Keep sleeping on your oxygen  Had flu shot    Follow-up in about 6 months (around 6/10/2019).        "

## 2019-02-12 ENCOUNTER — OFFICE VISIT (OUTPATIENT)
Dept: PODIATRY | Facility: CLINIC | Age: 74
End: 2019-02-12
Payer: MEDICARE

## 2019-02-12 VITALS
WEIGHT: 142 LBS | DIASTOLIC BLOOD PRESSURE: 89 MMHG | HEART RATE: 89 BPM | BODY MASS INDEX: 22.82 KG/M2 | SYSTOLIC BLOOD PRESSURE: 128 MMHG | TEMPERATURE: 99 F | HEIGHT: 66 IN

## 2019-02-12 DIAGNOSIS — M19.079 INFLAMMATION OF FOOT JOINT: Primary | ICD-10-CM

## 2019-02-12 PROCEDURE — 99214 PR OFFICE/OUTPT VISIT, EST, LEVL IV, 30-39 MIN: ICD-10-PCS | Mod: ,,, | Performed by: PODIATRIST

## 2019-02-12 PROCEDURE — 99214 OFFICE O/P EST MOD 30 MIN: CPT | Mod: ,,, | Performed by: PODIATRIST

## 2019-02-12 NOTE — PROGRESS NOTES
[address] [facility] [address]  [place] [address]  Phone: [phone]   [phone]    Patient's PCP:Efren Modi MD  Referring Provider: No ref. provider found    Subjective:      Chief Complaint:: Foot Pain (Would like new Orthotics)    DANIEL Alicia is a 73 y.o. female who presents with a complaint of left foot pain under 2nd toe,would like new orthotics,hers are worn out. Patients rates pain 8/10 on pain scale.      Vitals:    02/12/19 1014   BP: 128/89   Pulse: 89   Temp: 98.6 °F (37 °C)     Shoe Size: 7    Past Surgical History:   Procedure Laterality Date    BLADDER REPAIR      BREAST CYST EXCISION      FOOT SURGERY      bilateral    hernia repairs      HYSTERECTOMY      masectomy      right elbow tennis elbow      TONSILLECTOMY      TUBAL LIGATION       Past Medical History:   Diagnosis Date    Allergy     Mepridine, Bactrim, BEE sting    Breast cancer     Cervical spine fracture     Chronic bronchitis     COPD (chronic obstructive pulmonary disease)     Dysthymic disorder     Environmental allergies     GERD (gastroesophageal reflux disease)     Lumbar vertebral fracture     Lung disease     copd     Malignant neoplasm of overlapping sites of female breast     Mixed incontinence     Osteoporosis      Family History   Problem Relation Age of Onset    Diabetes Mother     Hypertension Father         Social History:   Marital Status:   Alcohol History:  reports that she does not drink alcohol.  Tobacco History:  reports that she quit smoking about 23 years ago. Her smoking use included cigarettes. She has a 10.00 pack-year smoking history. she has never used smokeless tobacco.  Drug History:  reports that she does not use drugs.    Review of patient's allergies indicates:   Allergen Reactions    Bee sting [allergen ext-venom-honey bee]     Meperidine     Sulfamethoxazole-trimethoprim     Sulfasalazine Hives       Current Outpatient Medications   Medication Sig  Dispense Refill    albuterol (PROAIR HFA) 90 mcg/actuation inhaler Inhale 2 puffs into the lungs every 6 (six) hours as needed for Wheezing. Rescue      anastrozole (ARIMIDEX) 1 mg Tab Take 30 tablets by mouth.      diltiaZEM (TIAZAC) 180 MG CpSR Take by mouth.      ibandronate (BONIVA) 150 mg tablet Take 1 tablet (150 mg total) by mouth every 30 days. 3 tablet 3    ibuprofen (ADVIL,MOTRIN) 800 MG tablet Take 800 mg by mouth every 6 (six) hours as needed for Pain.      loratadine (CLARITIN) 10 mg tablet Take 10 mg by mouth.      pantoprazole (PROTONIX) 40 MG tablet Take 1 tablet (40 mg total) by mouth once daily. 90 tablet 3    venlafaxine (EFFEXOR) 75 MG tablet Take 1 tablet (75 mg total) by mouth once daily. 90 tablet 3    fluticasone-salmeterol 250-50 mcg/dose (ADVAIR DISKUS) 250-50 mcg/dose diskus inhaler Inhale into the lungs.      fluticasone-umeclidin-vilanter (TRELEGY ELLIPTA) 100-62.5-25 mcg DsDv Inhale 1 puff into the lungs once daily. 1 each 6    HYDROcodone-acetaminophen (NORCO) 5-325 mg per tablet Take 10 tablets by mouth.      levoFLOXacin (LEVAQUIN) 750 MG tablet Take 30 tablets by mouth.      metroNIDAZOLE (FLAGYL) 500 MG tablet Take 30 tablets by mouth.      tiotropium (SPIRIVA WITH HANDIHALER) 18 mcg inhalation capsule Inhale into the lungs.       No current facility-administered medications for this visit.        Review of Systems   Constitutional: Negative for chills, fatigue, fever and unexpected weight change.   HENT: Negative for hearing loss and trouble swallowing.    Eyes: Negative for photophobia and visual disturbance.   Respiratory: Negative for cough, shortness of breath and wheezing.    Cardiovascular: Negative for chest pain, palpitations and leg swelling.   Gastrointestinal: Negative for abdominal pain and nausea.   Genitourinary: Negative for dysuria and frequency.   Musculoskeletal: Positive for arthralgias, back pain and neck pain. Negative for joint swelling.   Skin:  Negative for rash.   Neurological: Negative for tremors, seizures, weakness, numbness and headaches.   Hematological: Bruises/bleeds easily.         Objective:        Physical Exam:   Foot Exam    Left Foot/Ankle      Inspection and Palpation  Tenderness: lesser metatarsophalangeal joints (Pain plantar second MPJ left foot)  Swelling: none   Arch: pes cavus  Hammertoes: absent  Claw toes: absent  Hallux valgus: no  Hallux limitus: no  Skin Exam: skin intact;     Neurovascular  Dorsalis pedis: 2+  Posterior tibial: 2+  Saphenous nerve sensation: normal  Tibial nerve sensation: normal  Superficial peroneal nerve sensation: normal  Deep peroneal nerve sensation: normal  Sural nerve sensation: normal    Comments  Name plantar second MPJ left foot there is no ulcerations or signs of infection. Prominent second metatarsal head plantar surface of the foot.    Physical Exam   Cardiovascular:   Pulses:       Dorsalis pedis pulses are 2+ on the left side.        Posterior tibial pulses are 2+ on the left side.       Imaging:            Assessment:       1. Inflammation of foot joint      Plan:   Inflammation of foot joint    Will place accommodative felt pad on old custom orthotics. Was sent patient out for new custom orthotics to offload pressure areas. This will also help with her plantar fasciitis.  Because of pain radiating proximal along the extensor tendons of the left leg we will place the patient in a Cam Walker boot cast that she will use for approximately 4 weeks or until pain resolves. If pain does not resolve in 4 weeks she'll return to see me.  Follow-up if symptoms worsen or fail to improve.    Procedures - None    Counseling:     I provided patient education verbally regarding:   Patient diagnosis, treatment options, as well as alternatives, risks, and benefits.     This note was created using Dragon voice recognition software that occasionally misinterpreted phrases or words.

## 2019-02-25 ENCOUNTER — OFFICE VISIT (OUTPATIENT)
Dept: FAMILY MEDICINE | Facility: CLINIC | Age: 74
End: 2019-02-25
Payer: MEDICARE

## 2019-02-25 VITALS
HEART RATE: 63 BPM | HEIGHT: 66 IN | BODY MASS INDEX: 22.66 KG/M2 | RESPIRATION RATE: 16 BRPM | SYSTOLIC BLOOD PRESSURE: 120 MMHG | WEIGHT: 141 LBS | DIASTOLIC BLOOD PRESSURE: 66 MMHG | OXYGEN SATURATION: 97 %

## 2019-02-25 DIAGNOSIS — M81.8 OTHER OSTEOPOROSIS WITHOUT CURRENT PATHOLOGICAL FRACTURE: ICD-10-CM

## 2019-02-25 DIAGNOSIS — F34.1 DYSTHYMIA: ICD-10-CM

## 2019-02-25 DIAGNOSIS — E78.2 MIXED HYPERLIPIDEMIA: ICD-10-CM

## 2019-02-25 DIAGNOSIS — K21.00 GASTROESOPHAGEAL REFLUX DISEASE WITH ESOPHAGITIS: ICD-10-CM

## 2019-02-25 DIAGNOSIS — J44.89 CHRONIC OBSTRUCTIVE BRONCHITIS: Primary | ICD-10-CM

## 2019-02-25 LAB
CHOLEST/HDLC SERPL: 4.3 {RATIO}
CHOLESTEROL, TOTAL: 245
HIGH DENSITY CHOLESTEROL: 57 MG/DL
LDLC SERPL CALC-MCNC: 159 MG/DL
NONHDLC SERPL-MCNC: 188 MG/DL
TRIGL SERPL-MCNC: 145 MG/DL (ref 40–160)

## 2019-02-25 PROCEDURE — 80061 LIPID PANEL: CPT | Mod: QW,,, | Performed by: INTERNAL MEDICINE

## 2019-02-25 PROCEDURE — 80061 POCT LIPID PANEL: ICD-10-PCS | Mod: QW,,, | Performed by: INTERNAL MEDICINE

## 2019-02-25 PROCEDURE — 99214 OFFICE O/P EST MOD 30 MIN: CPT | Mod: ,,, | Performed by: INTERNAL MEDICINE

## 2019-02-25 PROCEDURE — 99214 PR OFFICE/OUTPT VISIT, EST, LEVL IV, 30-39 MIN: ICD-10-PCS | Mod: ,,, | Performed by: INTERNAL MEDICINE

## 2019-02-25 RX ORDER — DILTIAZEM HYDROCHLORIDE 180 MG/1
1 CAPSULE, COATED, EXTENDED RELEASE ORAL DAILY
COMMUNITY
Start: 2019-02-24 | End: 2019-02-25

## 2019-02-25 RX ORDER — IBANDRONATE SODIUM 150 MG/1
150 TABLET, FILM COATED ORAL
Qty: 3 TABLET | Refills: 3 | Status: SHIPPED | OUTPATIENT
Start: 2019-02-25

## 2019-02-25 NOTE — PROGRESS NOTES
Subjective:       Patient ID: Yee Alicia is a 73 y.o. female.    Chief Complaint: Asthma; COPD; Gastroesophageal Reflux; Anxiety; and Hyperlipidemia    Ms. Alicia is a 73-year-old  female who comes for follow-up. She has underlying reflux which is stable with pantoprazole.  She also has COPD and follows Dr. Gage for the same. She uses a combination of Advair inhaler and also as needed rescue B2 agonists. She also uses Advair/ Spiriva inhaler.     She had the last bone density done approximately a year or 2 back.     She has mild depression and anxiety for which she is taking venlafaxine with success. Her  was diagnosed with cancer and patient had some stress  because of those issues.         Gastroesophageal Reflux   She complains of heartburn. She reports no abdominal pain, no chest pain, no coughing, no hoarse voice or no sore throat. The current episode started more than 1 year ago. Associated symptoms include fatigue. She has tried a PPI for the symptoms. The treatment provided moderate relief.   COPD   This is a chronic problem. Associated symptoms include fatigue. Pertinent negatives include no abdominal pain, arthralgias, chest pain, chills, congestion, coughing, fever, headaches, joint swelling, rash or sore throat.   Hyperlipidemia   This is a chronic problem. The current episode started more than 1 year ago. The problem is uncontrolled. Recent lipid tests were reviewed and are variable. She has no history of obesity. Associated symptoms include shortness of breath. Pertinent negatives include no chest pain. She is currently on no antihyperlipidemic treatment. Compliance problems include psychosocial issues.        Past Medical History:   Diagnosis Date    Allergy     Mepridine, Bactrim, BEE sting    Breast cancer     Cervical spine fracture     Chronic bronchitis     COPD (chronic obstructive pulmonary disease)     Dysthymic disorder     Environmental allergies     GERD  (gastroesophageal reflux disease)     Lumbar vertebral fracture     Lung disease     copd     Malignant neoplasm of overlapping sites of female breast     Mixed incontinence     Osteoporosis      Social History     Socioeconomic History    Marital status:      Spouse name: Not on file    Number of children: Not on file    Years of education: Not on file    Highest education level: Not on file   Social Needs    Financial resource strain: Not on file    Food insecurity - worry: Not on file    Food insecurity - inability: Not on file    Transportation needs - medical: Not on file    Transportation needs - non-medical: Not on file   Occupational History    Occupation: homemaker    Tobacco Use    Smoking status: Former Smoker     Packs/day: 1.00     Years: 10.00     Pack years: 10.00     Types: Cigarettes     Last attempt to quit:      Years since quittin.1    Smokeless tobacco: Never Used   Substance and Sexual Activity    Alcohol use: No    Drug use: No    Sexual activity: Yes     Partners: Male   Other Topics Concern    Not on file   Social History Narrative    Not on file     Past Surgical History:   Procedure Laterality Date    BLADDER REPAIR      BREAST CYST EXCISION      FOOT SURGERY      bilateral    hernia repairs      HYSTERECTOMY      masectomy      right elbow tennis elbow      TONSILLECTOMY      TUBAL LIGATION       Family History   Problem Relation Age of Onset    Diabetes Mother     Hypertension Father        Review of Systems   Constitutional: Positive for fatigue. Negative for activity change, chills, fever and unexpected weight change.   HENT: Negative for congestion, hoarse voice, postnasal drip, sinus pressure and sore throat.    Eyes: Negative for pain, discharge and visual disturbance.   Respiratory: Positive for shortness of breath. Negative for cough and chest tightness.         COPD   Cardiovascular: Negative for chest pain, palpitations and leg  "swelling.   Gastrointestinal: Positive for heartburn. Negative for abdominal distention, abdominal pain and anal bleeding.        GERD   Genitourinary: Negative for difficulty urinating, dysuria, flank pain and frequency.   Musculoskeletal: Negative for arthralgias and joint swelling.   Skin: Negative for color change, pallor and rash.   Allergic/Immunologic: Negative for environmental allergies, food allergies and immunocompromised state.   Neurological: Negative for dizziness, seizures, light-headedness and headaches.   Hematological: Negative for adenopathy. Does not bruise/bleed easily.   Psychiatric/Behavioral: Negative for agitation, confusion and dysphoric mood. The patient is not nervous/anxious.         Mild depression       Objective:       Vitals:    02/25/19 0952   BP: 120/66   Pulse: 63   Resp: 16   SpO2: 97%   Weight: 64 kg (141 lb)   Height: 5' 6" (1.676 m)   PF: 360 L/min     Physical Exam   Constitutional: She appears well-developed and well-nourished. She is cooperative. No distress.   HENT:   Head: Normocephalic and atraumatic.   Eyes: Conjunctivae, EOM and lids are normal. Pupils are equal, round, and reactive to light. Lids are everted and swept, no foreign bodies found. Right pupil is round and reactive. Left pupil is round and reactive.   Neck: Trachea normal and normal range of motion. Neck supple.   Cardiovascular: Normal rate, regular rhythm, S1 normal and S2 normal. Exam reveals distant heart sounds.   Pulmonary/Chest: Effort normal. No respiratory distress. She has rales in the right lower field and the left lower field.   Crackles at the base of lung bilaterally.   Abdominal: Soft. Bowel sounds are normal. There is no rigidity and no guarding.   Musculoskeletal: Normal range of motion.   Lymphadenopathy:     She has no cervical adenopathy.   Neurological: She is alert.   Skin: Skin is warm and dry.   Psychiatric: Her affect is not labile and not inappropriate. She is not actively " hallucinating. Cognition and memory are not impaired. She does not express impulsivity.   Though not overtly depressed, patient appears to be anhedonic.   Nursing note and vitals reviewed.      Assessment:       1. Chronic obstructive bronchitis    2. Gastroesophageal reflux disease with esophagitis    3. Dysthymia    4. Other osteoporosis without current pathological fracture    5. Mixed hyperlipidemia         Plan:           Chronic obstructive bronchitis    Gastroesophageal reflux disease with esophagitis    Dysthymia    Other osteoporosis without current pathological fracture  -     ibandronate (BONIVA) 150 mg tablet; Take 1 tablet (150 mg total) by mouth every 30 days.  Dispense: 3 tablet; Refill: 3    Mixed hyperlipidemia  -     POCT Lipid Panel    Advised Ms. Alicia for Anti reflux measures like small feequent meals, avoid spicy and greasy food. Head end up at night.    Lipid panel is elevated with LDL cholesterol greater than 150. I decided to doing good. She has a family history of hyperlipidemia but nobody is on any medications for cholesterol.    She does not smoke cigarettes at this point. Her blood pressure is okay. She is not diabetic. Her 10 year risk would be probably on the low side for significant coronary artery disease. At this point I will advise her to consider fish oral capsules. Omega-3 fatty acid      She continues on Boniva with calcium and vitamin D3 supplementation. I will also recommended some strength exercises. All things being equal, then I'll see her back in 6 months time. Patient's oxygen saturations are fairly good at this point.    Follow-up in 6 months or earlier as needed.    Please note that patient has difficulty getting blood from her veins. Fingerstick was performed in the office for lipid panel.

## 2019-02-25 NOTE — PATIENT INSTRUCTIONS
Tips to Control Acid Reflux    To control acid reflux, youll need to make some basic diet and lifestyle changes. The simple steps outlined below may be all youll need to ease discomfort.  Watch what you eat  · Avoid fatty foods and spicy foods.  · Eat fewer acidic foods, such as citrus and tomato-based foods. These can increase symptoms.  · Limit drinking alcohol, caffeine, and fizzy beverages. All increase acid reflux.  · Try limiting chocolate, peppermint, and spearmint. These can worsen acid reflux in some people.  Watch when you eat  · Avoid lying down for 3 hours after eating.  · Do not snack before going to bed.  Raise your head  Raising your head and upper body by 4 to 6 inches helps limit reflux when youre lying down. Put blocks under the head of your bed frame to raise it.  Other changes  · Lose weight, if you need to  · Dont exercise near bedtime  · Avoid tight-fitting clothes  · Limit aspirin and ibuprofen  · Stop smoking   Date Last Reviewed: 7/1/2016  © 9525-9962 [employer]. [address] [place] [address]. All rights reserved. This information is not intended as a substitute for professional medical care. Always follow your healthcare professional's instructions.        Cholesterol Quiz  How much do you know about cholesterol? Gerardo each of the following statements True or False.     True False    ? ? 1. Nothing I do can lower my cholesterol.   ? ? 2. All cholesterol in my blood is bad.   ? ? 3. Exercise cant help me control my cholesterol.   ? ? 4. I dont have to worry if my cholesterol is just a little high.   ? ? 5. To lower my cholesterol, I just need to stop eating eggs.            Answers  1. FALSE. Your eating and exercise habits play a big role in controlling cholesterol. If you smoke, quitting can also help you get cholesterol under control. And your doctor can prescribe medicine if you need extra help.  2. FALSE. Some cholesterol is needed for your body to  work. And some types of cholesterol are better for your body than others.  3. FALSE. Exercise increases the amount of HDL (good) cholesterol in your bloodstream. This is good for your body and your health.  4. FALSE. Even if your cholesterol is just a little high, you are at increased risk for a heart attack or stroke.  5. FALSE. Egg yolks are high in cholesterol. But eating foods that are high in saturated fats and trans fats is more likely to raise your cholesterol levels.  Date Last Reviewed: 6/8/2015 © 2000-2017 [employer]. [address] [place] [address]. All rights reserved. This information is not intended as a substitute for professional medical care. Always follow your healthcare professional's instructions.

## 2019-04-04 ENCOUNTER — OFFICE VISIT (OUTPATIENT)
Dept: FAMILY MEDICINE | Facility: CLINIC | Age: 74
End: 2019-04-04
Payer: MEDICARE

## 2019-04-04 VITALS
WEIGHT: 141 LBS | HEIGHT: 66 IN | HEART RATE: 70 BPM | SYSTOLIC BLOOD PRESSURE: 122 MMHG | DIASTOLIC BLOOD PRESSURE: 67 MMHG | BODY MASS INDEX: 22.66 KG/M2

## 2019-04-04 DIAGNOSIS — Z71.84 ENCOUNTER FOR COUNSELING FOR TRAVEL: Primary | ICD-10-CM

## 2019-04-04 PROCEDURE — 99213 PR OFFICE/OUTPT VISIT, EST, LEVL III, 20-29 MIN: ICD-10-PCS | Mod: ,,, | Performed by: INTERNAL MEDICINE

## 2019-04-04 PROCEDURE — 99213 OFFICE O/P EST LOW 20 MIN: CPT | Mod: ,,, | Performed by: INTERNAL MEDICINE

## 2019-04-04 NOTE — PROGRESS NOTES
Subjective:       Patient ID: Yee Alicia is a 73 y.o. female.    Chief Complaint: Immunizations (trip to [facility] )    Ms. Fraire is a 73-year-old  female who plans to travel to [facility] to visit the [facility].    She is interested in getting appropriate immunizations.    Underlying medical issues include asthma for several years currently stable on albuterol and Advair.    She also has osteoporosis for which she takes Boniva and calcium with vitamin D3 supplements. She also is a breast cancer survivor and she is taking Arimidex.    Her childhood immunizations including MMR have been completed. Prevnar vaccine in June 2018 and annual influenza vaccine has been has been taken. She also had a shingles vaccine in past. She did get it tetanus vaccination in 2015.    She has never had any hepatitis vaccinations. He has been screened for hepatitis C which was negative in 2017.    She will be mostly going to the [facility]. She will be in a group tour.      Past Medical History:   Diagnosis Date    Allergy     Mepridine, Bactrim, BEE sting    Breast cancer     Cervical spine fracture     Chronic bronchitis     COPD (chronic obstructive pulmonary disease)     Dysthymic disorder     Environmental allergies     GERD (gastroesophageal reflux disease)     Lumbar vertebral fracture     Lung disease     copd     Malignant neoplasm of overlapping sites of female breast     Mixed incontinence     Osteoporosis      Social History     Socioeconomic History    Marital status:      Spouse name: Not on file    Number of children: Not on file    Years of education: Not on file    Highest education level: Not on file   Occupational History    Occupation: homemaker    Social Needs    Financial resource strain: Not on file    Food insecurity:     Worry: Not on file     Inability: Not on file    Transportation needs:     Medical: Not on file     Non-medical: Not on file   Tobacco  Use    Smoking status: Former Smoker     Packs/day: 1.00     Years: 10.00     Pack years: 10.00     Types: Cigarettes     Last attempt to quit:      Years since quittin.2    Smokeless tobacco: Never Used   Substance and Sexual Activity    Alcohol use: No    Drug use: No    Sexual activity: Yes     Partners: Male   Lifestyle    Physical activity:     Days per week: Not on file     Minutes per session: Not on file    Stress: Not on file   Relationships    Social connections:     Talks on phone: Not on file     Gets together: Not on file     Attends Anab[employer]ist service: Not on file     Active member of club or organization: Not on file     Attends meetings of clubs or organizations: Not on file     Relationship status: Not on file   Other Topics Concern    Not on file   Social History Narrative    Not on file     Past Surgical History:   Procedure Laterality Date    BLADDER REPAIR      BREAST CYST EXCISION      FOOT SURGERY      bilateral    hernia repairs      HYSTERECTOMY      masectomy      right elbow tennis elbow      TONSILLECTOMY      TUBAL LIGATION       Family History   Problem Relation Age of Onset    Diabetes Mother     Hypertension Father        Review of Systems   Constitutional: Positive for fatigue. Negative for activity change, chills, fever and unexpected weight change.   HENT: Negative for congestion, postnasal drip, sinus pressure and sore throat.    Eyes: Negative for pain, discharge and visual disturbance.   Respiratory: Positive for shortness of breath. Negative for cough and chest tightness.         COPD   Cardiovascular: Negative for chest pain, palpitations and leg swelling.   Gastrointestinal: Negative for abdominal distention, abdominal pain and anal bleeding.        GERD   Genitourinary: Negative for difficulty urinating, dysuria, flank pain and frequency.   Musculoskeletal: Negative for arthralgias and joint swelling.   Skin: Negative for color change, pallor and  rash.   Allergic/Immunologic: Negative for environmental allergies, food allergies and immunocompromised state.   Neurological: Negative for dizziness, seizures, light-headedness and headaches.   Hematological: Negative for adenopathy. Does not bruise/bleed easily.   Psychiatric/Behavioral: Negative for agitation, confusion and dysphoric mood. The patient is not nervous/anxious.         Mild depression         Objective:      Blood pressure 122/67, pulse 70, weight 64 kg (141 lb). Body mass index is 22.76 kg/m².  Physical Exam   Constitutional: She appears well-developed and well-nourished. She is cooperative. No distress.   HENT:   Head: Normocephalic.   Eyes: Conjunctivae, EOM and lids are normal. Lids are everted and swept, no foreign bodies found. Right pupil is round and reactive. Left pupil is round and reactive.   Neck: Trachea normal and normal range of motion. Neck supple.   Cardiovascular: Normal rate, regular rhythm, S1 normal and S2 normal. Exam reveals distant heart sounds.   Pulmonary/Chest: Effort normal. No respiratory distress. She has rales in the right lower field and the left lower field.   Crackles at the base of lung bilaterally.   Abdominal: Soft. There is no rigidity and no guarding.   Lymphadenopathy:     She has no cervical adenopathy.   Neurological: She is alert.   Psychiatric:   Though not overtly depressed, patient appears to be anhedonic.   Nursing note and vitals reviewed.        Assessment:       1. Encounter for counseling for travel           Office Visit on 02/25/2019   Component Date Value Ref Range Status    Cholesterol, Total 02/25/2019 245   Final    HDL 02/25/2019 57  mg/dl Final    Triglycerides 02/25/2019 145  40 - 160 mg/dL Final    LDL Cholesterol 02/25/2019 159  mg/dL Final    Total Cholesterol/HDL Ratio 02/25/2019 4.3   Final    Non-HDL Cholesterol 02/25/2019 188   Final         Plan:           Encounter for counseling for travel      For her trip to [facility] she  will need the following.    As per CDC recommendations.    1) hepatitis A vaccination especially with risk of food borne and  and water borne illnesses  2) hepatitis B vaccination or a combination of hepatitis A or B and at an accelerated schedule.      The accelerated vaccination schedule calls for vaccine doses administered at days 0, 7, and 21-30; a booster should be administered at 12 months to promote long-term immunity. A combined hepatitis A and hepatitis B vaccine can also be used on the same 3-dose schedule (0, 7, and 21-30 days), with a booster at 12 months.    Malaria prophylaxis with either of the following.    1) doxycycline 100 mg every day to be started for 3 days before start of her trip and to be continued at least 4 weeks after she finishes her troponin comes back to [place]. (Watch out for sun exposure)     2) Atovaquone/ proguanil- 250 mg start 1 tablet a day 1[facility], exercise and weight management. Cut down on fats increase. More greens and vegetables. To continue one week after she returns back to [place].    3)Mefloquine 250 mg/week -to start to 2- 3 weeks before exposure-1 tablet every week and to continue for 4 weeks after coming back to [facility].    Rabies vaccination-not sure if this is indicated-but if so-Imovax 1 mL on days 0, 7, 21 or 28 day for 3 total doses for high risk areas.    Typhoid vaccination- Vivotif-1 cap by mouth daily for 4 doses. I am not sure if this may be indicated.      Then she also has to check if there might be potential exposure to rabies or typhoid and appropriate vaccinations will therefore be recommended.    Patient has been explained about potential costs and lack of regular Medicare for most of these immunizations.    I Called patient on Friday also to see if she had checked with her  as to what is needed. Her  office was closed and will get together back on Monday  again.    Of course, she should carry on her medications including asthma inhalers and supplies as needed. Routine precautions for travel including exposure and hygiene.    She will keep her regular follow up.            Current Outpatient Medications:     albuterol (PROAIR HFA) 90 mcg/actuation inhaler, Inhale 2 puffs into the lungs every 6 (six) hours as needed for Wheezing. Rescue, Disp: , Rfl:     anastrozole (ARIMIDEX) 1 mg Tab, Take 30 tablets by mouth., Disp: , Rfl:     diltiaZEM (TIAZAC) 180 MG CpSR, Take by mouth., Disp: , Rfl:     fluticasone-salmeterol 250-50 mcg/dose (ADVAIR DISKUS) 250-50 mcg/dose diskus inhaler, Inhale into the lungs., Disp: , Rfl:     fluticasone-umeclidin-vilanter (TRELEGY ELLIPTA) 100-62.5-25 mcg DsDv, Inhale 1 puff into the lungs once daily., Disp: 1 each, Rfl: 6    ibandronate (BONIVA) 150 mg tablet, Take 1 tablet (150 mg total) by mouth every 30 days., Disp: 3 tablet, Rfl: 3    ibuprofen (ADVIL,MOTRIN) 800 MG tablet, Take 800 mg by mouth every 6 (six) hours as needed for Pain., Disp: , Rfl:     loratadine (CLARITIN) 10 mg tablet, Take 10 mg by mouth., Disp: , Rfl:     pantoprazole (PROTONIX) 40 MG tablet, Take 1 tablet (40 mg total) by mouth once daily., Disp: 90 tablet, Rfl: 3    tiotropium (SPIRIVA WITH HANDIHALER) 18 mcg inhalation capsule, Inhale into the lungs., Disp: , Rfl:     venlafaxine (EFFEXOR) 75 MG tablet, Take 1 tablet (75 mg total) by mouth once daily., Disp: 90 tablet, Rfl: 3

## 2019-04-04 NOTE — PATIENT INSTRUCTIONS
Traveler's Diarrhea (Adult)    Traveler's diarrhea is an infection in the intestinal tract caused by bacteria called E coli. This bacteria is commonly found in water supplies of developing countries. The local people of those countries are used to E coli in the water and don't get sick. Tourists who drink contaminated water or eat foods that were washed or prepared with this water may become very ill.  The illness begins 1 to 3 days after exposure and lasts up to 5 days. Symptoms include fever, vomiting, stomach cramping and watery diarrhea. There may also be blood or mucus in the stool. Mild cases will get better without treatment. Antibiotics are used for more severe cases.  Home care  · If you were prescribed antibiotics,  take them until they are finished.  · You may use acetaminophen or ibuprofen to control fever, unless another medicine was prescribed. If you have chronic liver or kidney disease or have ever had a stomach ulcer or gastrointestinal  bleeding, talk with the doctor before using these medicines. Aspirin should never be used in anyone under 18 years of age who is ill with a fever. It may cause severe illness or death.  · Do not take over-the-counter anti-diarrheal medicines, unless advised by the doctor.  Once vomiting stops, follow these guidelines:  During the first 12 to 24 hours, follow the diet below:  · Fruit juices. Apple, grape and cranberry juice; clear fruit drinks, electrolyte replacement and sports drinks.  · Beverages. Soft drinks without caffeine; mineral water (plain or flavored); decaffeinated tea and coffee  · Soups. Clear broth, consommé and bouillon.  · Desserts. Plain gelatin, popsicles and fruit juice bars; As you feel better, you may add 6 to 8 oz of yogurt per day.  During the next 24 hours, you may add the following to the above:  · Hot cereal, plain toast, bread, rolls, crackers  · Plain noodles, rice, mashed potatoes, chicken noodle or rice soup  · Unsweetened canned  fruit (avoid pineapple), bananas  · Limit fat intake to less than 15 grams per day by avoiding margarine, butter, oils, mayonnaise, sauces, gravies, fried foods, peanut butter, meat, poultry and fish.  · Limit fiber; avoid raw or cooked vegetables, fresh fruits (except bananas) and bran cereals.  · Limit caffeine and chocolate. No spices or seasonings except salt.  During the next 24 hours you can gradually resume a normal diet as the symptoms lessen.  Follow-up care  Follow up with your healthcare provider, or as advised. Call if you are not improving within 24 hours or if the diarrhea lasts more than 1 week on antibiotics. If a stool (diarrhea) sample was taken, you may call in 2 days (or as directed) for the results.  When to seek medical advice  Call your healthcare provider if any of these occur:  · Severe constant pain in the lower part of the abdomen, on the right side  · Blood in diarrhea or vomit, dark coffee ground appearing vomit, or dark tarry stools  · continued vomiting (unable to keep liquids down)  · Frequent diarrhea (more than 5 times a day); blood (red or black) or mucus in diarrhea  · Reduced oral intake  · Reduced urine output or extreme thirst  · Weakness, dizziness, fainting  · Drowsiness, confusion, stiff neck, or seizure  · Fever (1 degree above your normal temperature) lasting 24 to 48 hours, or whatever your healthcare provider told you to report based on your condition  Date Last Reviewed: 11/16/2015  © 2085-4389 [employer]. [address] [place] [address], [place] [address]. All rights reserved. This information is not intended as a substitute for professional medical care. Always follow your healthcare professional's instructions.

## 2019-04-09 ENCOUNTER — OFFICE VISIT (OUTPATIENT)
Dept: PODIATRY | Facility: CLINIC | Age: 74
End: 2019-04-09
Payer: MEDICARE

## 2019-04-09 VITALS
WEIGHT: 141 LBS | BODY MASS INDEX: 22.66 KG/M2 | HEART RATE: 69 BPM | SYSTOLIC BLOOD PRESSURE: 132 MMHG | OXYGEN SATURATION: 99 % | RESPIRATION RATE: 18 BRPM | DIASTOLIC BLOOD PRESSURE: 74 MMHG | HEIGHT: 66 IN

## 2019-04-09 DIAGNOSIS — M19.079 INFLAMMATION OF FOOT JOINT: Primary | ICD-10-CM

## 2019-04-09 DIAGNOSIS — M21.371 FOOT DROP, BILATERAL: ICD-10-CM

## 2019-04-09 DIAGNOSIS — M21.372 FOOT DROP, BILATERAL: ICD-10-CM

## 2019-04-09 PROCEDURE — 99213 PR OFFICE/OUTPT VISIT, EST, LEVL III, 20-29 MIN: ICD-10-PCS | Mod: ,,, | Performed by: PODIATRIST

## 2019-04-09 PROCEDURE — 99213 OFFICE O/P EST LOW 20 MIN: CPT | Mod: ,,, | Performed by: PODIATRIST

## 2019-04-09 NOTE — PROGRESS NOTES
[address] [facility] [address]  [place] [address]  Phone: [phone]   [phone]    Patient's PCP:Efren Modi MD  Referring Provider: No ref. provider found    Subjective:      Chief Complaint:: Follow-up (orthotics)    DANIEL Alicia is a 73 y.o. female who presents for an orthotic check. Patient is complaining about the arch of the orthotics causing aching pain in toes (bilateral). Patients rates pain 6/10 on pain scale.      Vitals:    04/09/19 0934   BP: 132/74   Pulse: 69   Resp: 18     Shoe Size: 7 wide    Past Surgical History:   Procedure Laterality Date    BLADDER REPAIR      BREAST CYST EXCISION      FOOT SURGERY      bilateral    hernia repairs      HYSTERECTOMY      masectomy      right elbow tennis elbow      TONSILLECTOMY      TUBAL LIGATION       Past Medical History:   Diagnosis Date    Allergy     Mepridine, Bactrim, BEE sting    Breast cancer     Cervical spine fracture     Chronic bronchitis     COPD (chronic obstructive pulmonary disease)     Dysthymic disorder     Environmental allergies     GERD (gastroesophageal reflux disease)     Lumbar vertebral fracture     Lung disease     copd     Malignant neoplasm of overlapping sites of female breast     Mixed incontinence     Osteoporosis      Family History   Problem Relation Age of Onset    Diabetes Mother     Hypertension Father         Social History:   Marital Status:   Alcohol History:  reports that she does not drink alcohol.  Tobacco History:  reports that she quit smoking about 23 years ago. Her smoking use included cigarettes. She has a 10.00 pack-year smoking history. She has never used smokeless tobacco.  Drug History:  reports that she does not use drugs.    Review of patient's allergies indicates:   Allergen Reactions    Venom-honey bee Anaphylaxis    Sulfa (sulfonamide antibiotics)     Bee sting [allergen ext-venom-honey bee]     Meperidine     Sulfamethoxazole-trimethoprim      Sulfasalazine Hives       Current Outpatient Medications   Medication Sig Dispense Refill    albuterol (PROAIR HFA) 90 mcg/actuation inhaler Inhale 2 puffs into the lungs every 6 (six) hours as needed for Wheezing. Rescue      anastrozole (ARIMIDEX) 1 mg Tab Take 30 tablets by mouth.      diltiaZEM (TIAZAC) 180 MG CpSR Take by mouth.      fluticasone-salmeterol 250-50 mcg/dose (ADVAIR DISKUS) 250-50 mcg/dose diskus inhaler Inhale into the lungs.      fluticasone-umeclidin-vilanter (TRELEGY ELLIPTA) 100-62.5-25 mcg DsDv Inhale 1 puff into the lungs once daily. 1 each 6    ibandronate (BONIVA) 150 mg tablet Take 1 tablet (150 mg total) by mouth every 30 days. 3 tablet 3    ibuprofen (ADVIL,MOTRIN) 800 MG tablet Take 800 mg by mouth every 6 (six) hours as needed for Pain.      loratadine (CLARITIN) 10 mg tablet Take 10 mg by mouth.      pantoprazole (PROTONIX) 40 MG tablet Take 1 tablet (40 mg total) by mouth once daily. 90 tablet 3    tiotropium (SPIRIVA WITH HANDIHALER) 18 mcg inhalation capsule Inhale into the lungs.      venlafaxine (EFFEXOR) 75 MG tablet Take 1 tablet (75 mg total) by mouth once daily. 90 tablet 3     No current facility-administered medications for this visit.        Review of Systems   Constitutional: Negative for chills, fatigue, fever and unexpected weight change.   HENT: Negative for hearing loss and trouble swallowing.    Eyes: Negative for photophobia and visual disturbance.   Respiratory: Negative for cough, shortness of breath and wheezing.    Cardiovascular: Negative for chest pain, palpitations and leg swelling.   Gastrointestinal: Negative for abdominal pain and nausea.   Genitourinary: Negative for dysuria and frequency.   Musculoskeletal: Negative for arthralgias, back pain and joint swelling.   Skin: Negative for rash.   Neurological: Negative for tremors, seizures, weakness, numbness and headaches.   Hematological: Does not bruise/bleed easily.         Objective:         Physical Exam:   Foot Exam    General  General Appearance: appears stated age and healthy   Orientation: alert and oriented to person, place, and time   Affect: appropriate   Gait: unimpaired       Right Foot/Ankle     Inspection and Palpation  Ecchymosis: none  Tenderness: lesser metatarsophalangeal joints (Second MPJ plantar surface)  Swelling: none   Arch: normal  Skin Exam: skin intact;     Neurovascular  Dorsalis pedis: 2+  Posterior tibial: 2+  Saphenous nerve sensation: normal  Tibial nerve sensation: normal  Superficial peroneal nerve sensation: normal  Deep peroneal nerve sensation: normal  Sural nerve sensation: normal    Muscle Strength  Ankle dorsiflexion: 5  Ankle plantar flexion: 5  Ankle inversion: 5  Ankle eversion: 5  Great toe extension: 5  Great toe flexion: 5    Comments  Pain on palpation plantar surface and second metatarsal phalangeal joint    Left Foot/Ankle      Inspection and Palpation  Ecchymosis: none  Tenderness: lesser metatarsophalangeal joints (Second MPJ plantar surface)  Swelling: none   Arch: normal  Skin Exam: skin intact;     Neurovascular  Dorsalis pedis: 2+  Posterior tibial: 2+  Saphenous nerve sensation: normal  Tibial nerve sensation: normal  Superficial peroneal nerve sensation: normal  Deep peroneal nerve sensation: normal  Sural nerve sensation: normal    Muscle Strength  Ankle dorsiflexion: 5  Ankle plantar flexion: 5  Ankle inversion: 5  Ankle eversion: 5  Great toe extension: 5  Great toe flexion: 5    Range of Motion    Passive  1st MTP flexion: 5      Comments  Pain keratosis plantar second MPJ, elevated first metatarsal.    Physical Exam   Cardiovascular:   Pulses:       Dorsalis pedis pulses are 2+ on the right side, and 2+ on the left side.        Posterior tibial pulses are 2+ on the right side, and 2+ on the left side.       Imaging:            Assessment:       1. Inflammation of foot joint    2. Foot drop, bilateral      Plan:   Inflammation of foot  joint    Foot drop, bilateral    The patient is complaining of ill fitting custom-made orthotics. I am ordering the metatarsal pad to be removed on the left and the accommodation in the increase for the second MPJ on both orthotics. She'll return as needed.  No follow-ups on file.    Procedures - None    Counseling:   I explained what joint inflammation is and  conservative treatment of off loading the joint with OTC inserts and pads vs custom made orthotics.  The use of ice, heat, oral antiinflammatory and topical antiinflammatory and shoe modification as well as rest of the affected area with decrease walking, standing and non-impact exercising.  I provided patient education verbally regarding:   Patient diagnosis, treatment options, as well as alternatives, risks, and benefits.     This note was created using Dragon voice recognition software that occasionally misinterpreted phrases or words.

## 2019-05-10 ENCOUNTER — TELEPHONE (OUTPATIENT)
Dept: PODIATRY | Facility: CLINIC | Age: 74
End: 2019-05-10

## 2019-05-10 NOTE — TELEPHONE ENCOUNTER
S/w Laura.    Asked if they needed an order for this and she said they should be okay because they have the clinical notes.  They will call pt to schedule appt to get the inserts made.

## 2019-05-10 NOTE — TELEPHONE ENCOUNTER
Laura with [facility] called regarding patient and her custom made inserts.    Dr Lopez gave pt Jab[facility] repair order to have her inserts repaired.     Laura said that pt came in with her inserts and said she does not like them and that they do not feel right.  Pt pulled out her old inserts and confirmed that they are a little different.  Pt said she wants [facility] to do foam inserts (pt went to  orthotics before and never had problems).  Laura told pt that she is following Dr. Lopez's orders.     Laura said if Dr. Lopez is okay with pt getting the foam inserts done by [facility] they can do them, just needs doctors approval.      Do you approve pt getting foam custom made inserts from [facility]?

## 2019-05-31 ENCOUNTER — TELEPHONE (OUTPATIENT)
Dept: PULMONOLOGY | Facility: CLINIC | Age: 74
End: 2019-05-31

## 2019-05-31 NOTE — TELEPHONE ENCOUNTER
Says she needs a script for Malaria because she is going to [place].  Can you write her one she is up to date on all of her immunizations.

## 2019-05-31 NOTE — TELEPHONE ENCOUNTER
I spoke with patient and spoke with Dr. Gage she needs to go to her PCP or health department. Also explained to patient that she definitely needs to bring her oxygen with her and wear it continuously throughout her flight.

## 2019-06-11 ENCOUNTER — OFFICE VISIT (OUTPATIENT)
Dept: PULMONOLOGY | Facility: CLINIC | Age: 74
End: 2019-06-11
Payer: MEDICARE

## 2019-06-11 VITALS
WEIGHT: 142 LBS | HEIGHT: 66 IN | HEART RATE: 69 BPM | OXYGEN SATURATION: 93 % | BODY MASS INDEX: 22.82 KG/M2 | DIASTOLIC BLOOD PRESSURE: 65 MMHG | SYSTOLIC BLOOD PRESSURE: 110 MMHG

## 2019-06-11 DIAGNOSIS — J44.9 CHRONIC OBSTRUCTIVE PULMONARY DISEASE, UNSPECIFIED COPD TYPE: Primary | ICD-10-CM

## 2019-06-11 DIAGNOSIS — G47.34 NOCTURNAL HYPOXEMIA: ICD-10-CM

## 2019-06-11 PROCEDURE — 99214 OFFICE O/P EST MOD 30 MIN: CPT | Mod: ,,, | Performed by: NURSE PRACTITIONER

## 2019-06-11 PROCEDURE — 99214 PR OFFICE/OUTPT VISIT, EST, LEVL IV, 30-39 MIN: ICD-10-PCS | Mod: ,,, | Performed by: NURSE PRACTITIONER

## 2019-06-11 RX ORDER — LORATADINE 10 MG/1
10 TABLET ORAL DAILY
Qty: 90 TABLET | Refills: 6 | COMMUNITY
Start: 2019-06-11 | End: 2020-06-10

## 2019-06-11 NOTE — PATIENT INSTRUCTIONS
Continue the Trelegy daily  Refill Trelegy and Claritin  Wear your oxygen throughout the entire flight   Treatment for COPD    Your healthcare provider will prescribe the best treatments for your COPD.  Treatment  Recommendations include the following:  · Medicines. Some medicines help relieve symptoms when you have them. Others are taken daily to control inflammation in the lungs. Always take your medicines as prescribed. Learn the names of your medicines, as well as how and when to use them.  · Oxygen therapy. Oxygen may be prescribed if tests show that your blood contains too little oxygen.  · Smoking. If you smoke, quit. Smoking is the main cause of COPD. Quitting will help you be able to better manage your COPD. Ask your healthcare provider about ways to help you quit smoking.  · Avoiding infections. Infections, like a cold or the flu, can cause your symptoms to worsen. Try to stay away from people who are sick. Wash your hands often. And, ask your healthcare provider about vaccines for the flu and pneumonia.  Coping with shortness of breath  Coping tips include the following:  · Exercise. Try to be as active as possible. This will improve energy levels and strengthen your muscles, so you can do more.  · Breathing techniques. Ask your healthcare provider or nurse to show you how to do pursed-lip breathing.  · Balance rest and activity. Each day, try to balance rest periods with activity. For example, you might start the day with getting dressed and eating breakfast, then relax and read the paper. After that, take a brief walk. And then sit with your feet up for a while.  · Pulmonary rehabilitation. Ask your provider, or call your local hospital to find out about pulmonary rehab programs. The programs help with managing your disease, breathing techniques, exercise, support and counseling.  · Healthy eating. Eating a healthy, balanced diet and making an effort to maintain your ideal weight are important to  staying as healthy as possible. Make sure you have a lot of fruit and vegetables every day, as well as balanced portions of whole grains, lean meats and fish, and low-fat dairy products.  Date Last Reviewed: 5/1/2016  © 4140-6589 [employer]. [address] [place] [id number]. All rights reserved. This information is not intended as a substitute for professional medical care. Always follow your healthcare professional's instructions.    Continue the Trelegy daily  Refill Trelegy and Claritin  Wear your oxygen throughout the entire flight

## 2019-06-11 NOTE — PROGRESS NOTES
SUBJECTIVE:    Patient ID: Yee Alicia is a 73 y.o. female.    Chief Complaint: COPD    HPI   Patient here today feeling well. She is using Trelegy daily. She is going to [place] this month. She has a portable concentrator to bring with her.   Past Medical History:   Diagnosis Date    Allergy     Mepridine, Bactrim, BEE sting    Breast cancer     Cervical spine fracture     Chronic bronchitis     COPD (chronic obstructive pulmonary disease)     Dysthymic disorder     Environmental allergies     GERD (gastroesophageal reflux disease)     Lumbar vertebral fracture     Lung disease     copd     Malignant neoplasm of overlapping sites of female breast     Mixed incontinence     Osteoporosis      Past Surgical History:   Procedure Laterality Date    BLADDER REPAIR      BREAST CYST EXCISION      FOOT SURGERY      bilateral    hernia repairs      HYSTERECTOMY      masectomy      right elbow tennis elbow      TONSILLECTOMY      TUBAL LIGATION       Family History   Problem Relation Age of Onset    Diabetes Mother     Hypertension Father         Social History:   Marital Status:   Occupation: homemaker   Alcohol History:  reports that she does not drink alcohol.  Tobacco History:  reports that she quit smoking about 23 years ago. Her smoking use included cigarettes. She has a 10.00 pack-year smoking history. She has never used smokeless tobacco.  Drug History:  reports that she does not use drugs.    Review of patient's allergies indicates:   Allergen Reactions    Bee sting [allergen ext-venom-honey bee]     Meperidine     Sulfamethoxazole-trimethoprim     Sulfasalazine Hives       Current Outpatient Medications   Medication Sig Dispense Refill    albuterol (PROAIR HFA) 90 mcg/actuation inhaler Inhale 2 puffs into the lungs every 6 (six) hours as needed for Wheezing. Rescue      diltiaZEM (TIAZAC) 180 MG CpSR Take by mouth.      ibandronate (BONIVA) 150 mg tablet Take 1 tablet (150  "mg total) by mouth every 30 days. 3 tablet 3    ibuprofen (ADVIL,MOTRIN) 800 MG tablet Take 800 mg by mouth every 6 (six) hours as needed for Pain.      loratadine (CLARITIN) 10 mg tablet Take 10 mg by mouth.      pantoprazole (PROTONIX) 40 MG tablet Take 1 tablet (40 mg total) by mouth once daily. 90 tablet 3    venlafaxine (EFFEXOR) 75 MG tablet Take 1 tablet (75 mg total) by mouth once daily. 90 tablet 3    anastrozole (ARIMIDEX) 1 mg Tab Take 30 tablets by mouth.      fluticasone-umeclidin-vilanter (TRELEGY ELLIPTA) 100-62.5-25 mcg DsDv Inhale 1 puff into the lungs once daily. 1 each 6    fluticasone-umeclidin-vilanter (TRELEGY ELLIPTA) 100-62.5-25 mcg DsDv Inhale 1 puff into the lungs once daily. 3 each 6    loratadine (CLARITIN) 10 mg tablet Take 1 tablet (10 mg total) by mouth once daily. 90 tablet 6     No current facility-administered medications for this visit.          Last PFT: 12/13/2017  Last Chest xray :03/21/2016    Review of Systems  General: Feeling Well.  Eyes: Vision is good.  ENT:  No sinusitis or pharyngitis.   Heart:: No chest pain or palpitations.  Lungs: breathing is stable   GI: No Nausea, vomiting, constipation, diarrhea, or reflux.  : No dysuria, hesitancy, or nocturia.  Musculoskeletal: back and neck pAin   Skin: No lesions or rashes.  Neuro: No headaches or neuropathy.  Lymph: No edema or adenopathy.  Psych: No anxiety or depression.  Endo: No weight change.    OBJECTIVE:      /65 (BP Location: Left arm, Patient Position: Sitting, BP Method: Medium (Manual))   Pulse 69   Ht 5' 6" (1.676 m)   Wt 64.4 kg (142 lb)   SpO2 (!) 93%   BMI 22.92 kg/m²     Physical Exam  GENERAL: Older patient in no distress.  HEENT: Pupils equal and reactive. Extraocular movements intact. Nose intact.      Pharynx moist.  NECK: Supple.   HEART: Regular rate and rhythm. No murmur or gallop auscultated.  LUNGS: crackles to bases posteriorly. Lung excursion symmetrical. No change in fremitus. " No adventitial noises.  ABDOMEN: Bowel sounds present. Non-tender, no masses palpated.  EXTREMITIES: Normal muscle tone and joint movement, no cyanosis or clubbing.   LYMPHATICS: No adenopathy palpated, no edema.  SKIN: Dry, intact, no lesions.   NEURO: Cranial nerves II-XII intact. Motor strength 5/5 bilaterally, upper and lower extremities.  PSYCH: Appropriate affect.    :         Assessment:       1. Chronic obstructive pulmonary disease, unspecified COPD type    2. Nocturnal hypoxemia          Plan:       Chronic obstructive pulmonary disease, unspecified COPD type    Nocturnal hypoxemia    Other orders  -     fluticasone-umeclidin-vilanter (TRELEGY ELLIPTA) 100-62.5-25 mcg DsDv; Inhale 1 puff into the lungs once daily.  Dispense: 3 each; Refill: 6  -     loratadine (CLARITIN) 10 mg tablet; Take 1 tablet (10 mg total) by mouth once daily.  Dispense: 90 tablet; Refill: 6        Continue the Trelegy daily  Refill Trelegy and Claritin  Wear your oxygen throughout the entire flight   Follow up in about 6 months (around 12/11/2019).

## 2019-08-27 ENCOUNTER — TELEPHONE (OUTPATIENT)
Dept: FAMILY MEDICINE | Facility: CLINIC | Age: 74
End: 2019-08-27

## 2019-10-24 DIAGNOSIS — F34.1 DYSTHYMIA: ICD-10-CM

## 2019-10-24 RX ORDER — VENLAFAXINE 75 MG/1
TABLET ORAL
Qty: 90 TABLET | Refills: 1 | Status: SHIPPED | OUTPATIENT
Start: 2019-10-24

## 2019-11-19 ENCOUNTER — TELEPHONE (OUTPATIENT)
Dept: FAMILY MEDICINE | Facility: CLINIC | Age: 74
End: 2019-11-19

## 2019-11-19 NOTE — TELEPHONE ENCOUNTER
Called patient re: Due for Colonoscopy, she was not in , call back in an hour. Then realized patient has moved out of area is noted in her chart.